# Patient Record
Sex: FEMALE | Race: WHITE | Employment: FULL TIME | URBAN - METROPOLITAN AREA
[De-identification: names, ages, dates, MRNs, and addresses within clinical notes are randomized per-mention and may not be internally consistent; named-entity substitution may affect disease eponyms.]

---

## 2020-01-21 ENCOUNTER — OFFICE VISIT (OUTPATIENT)
Dept: FAMILY MEDICINE CLINIC | Age: 55
End: 2020-01-21

## 2020-01-21 VITALS
BODY MASS INDEX: 41.78 KG/M2 | SYSTOLIC BLOOD PRESSURE: 163 MMHG | TEMPERATURE: 96.9 F | OXYGEN SATURATION: 96 % | HEIGHT: 66 IN | RESPIRATION RATE: 16 BRPM | HEART RATE: 93 BPM | DIASTOLIC BLOOD PRESSURE: 93 MMHG | WEIGHT: 260 LBS

## 2020-01-21 DIAGNOSIS — G56.01 CARPAL TUNNEL SYNDROME OF RIGHT WRIST: Primary | ICD-10-CM

## 2020-01-21 DIAGNOSIS — Z00.00 LABORATORY EXAM ORDERED AS PART OF ROUTINE GENERAL MEDICAL EXAMINATION: ICD-10-CM

## 2020-01-21 DIAGNOSIS — R20.0 NUMBNESS AND TINGLING: ICD-10-CM

## 2020-01-21 DIAGNOSIS — Z76.89 ENCOUNTER TO ESTABLISH CARE: ICD-10-CM

## 2020-01-21 DIAGNOSIS — Z13.1 DIABETES MELLITUS SCREENING: ICD-10-CM

## 2020-01-21 DIAGNOSIS — R20.2 NUMBNESS AND TINGLING: ICD-10-CM

## 2020-01-21 DIAGNOSIS — R53.83 FATIGUE, UNSPECIFIED TYPE: ICD-10-CM

## 2020-01-21 DIAGNOSIS — E66.01 OBESITY, MORBID (HCC): ICD-10-CM

## 2020-01-21 DIAGNOSIS — E55.9 VITAMIN D DEFICIENCY: ICD-10-CM

## 2020-01-21 DIAGNOSIS — Z13.220 LIPID SCREENING: ICD-10-CM

## 2020-01-21 RX ORDER — IBUPROFEN 200 MG
200 TABLET ORAL
COMMUNITY
End: 2021-10-14

## 2020-01-21 RX ORDER — BISMUTH SUBSALICYLATE 262 MG
1 TABLET,CHEWABLE ORAL DAILY
COMMUNITY

## 2020-01-21 NOTE — PROGRESS NOTES
Chief Complaint   Patient presents with    New Patient    Tingling     numbness and tingling/pain of feet, mostly at night; sx present for approx 1 yr- has become worse <6mo    Labs     routine labs; pt states she has not been to the doctor in quite some time       HPI:  [facility] is a 62y.o. year old female who is a new patient and is here to establish care. She was previously followed by no one in many years. She moved here for work, she is from [place]. Her sister is Yusuf Turner, another patient of mine. She works for [employer] and . Right Hand Carpal Tunnel:  Numbness/Tingling in right hand, thumb and first finger. She feels it is from using her mouse for years. She works for [employer] and [employer] and . Her symptoms are present all the time. Tingling: For approximately 1 year she reports numbness and tingling in both feet, worse in the left, worse at night but not every night. She occasionally has some throbbing pain in the arch of her foot, very infrequent. She has tried Aleve, Ibuprofen infrequently. Her feet feel full, heavy at night when they are hurting. She reports sensitivity with touching her feet. Her symptoms have really worsened over the past year. Elevated Blood Pressure:  No hx of HTN. Denies CP, SOB. Does report some intermittent CHRIS. Denies HA. BP Readings from Last 3 Encounters:   01/21/20 (!) 163/93     3 most recent PHQ Screens 1/21/2020   Little interest or pleasure in doing things Not at all   Feeling down, depressed, irritable, or hopeless Not at all   Total Score PHQ 2 0     The following sections were reviewed & updated as appropriate: PMH, PSH, PL, FMH,  and SH. Past Medical History:   Diagnosis Date    Carpal tunnel syndrome of right wrist     Cellulitis and abscess of left lower extremity        History reviewed. No pertinent surgical history.     Patient Active Problem List   Diagnosis Code    Obesity, morbid (HCC) E66.01    Carpal tunnel syndrome of right wrist G56.01    Numbness and tingling R20.0, R20.2        Family History   Problem Relation Age of Onset    Other Mother         osteopenia    Arthritis-osteo Mother     Hypertension Mother     Diabetes Mother     Hypertension Father     Diabetes Father     Heart Disease Father     Hypertension Sister        Social History     Socioeconomic History    Marital status: UNKNOWN     Spouse name: Not on file    Number of children: Not on file    Years of education: Not on file    Highest education level: Not on file   Occupational History    Not on file   Social Needs    Financial resource strain: Not on file    Food insecurity:     Worry: Not on file     Inability: Not on file    Transportation needs:     Medical: Not on file     Non-medical: Not on file   Tobacco Use    Smoking status: Never Smoker    Smokeless tobacco: Never Used   Substance and Sexual Activity    Alcohol use: Not Currently    Drug use: Never    Sexual activity: Not Currently     Partners: Male   Lifestyle    Physical activity:     Days per week: Not on file     Minutes per session: Not on file    Stress: Not on file   Relationships    Social connections:     Talks on phone: Not on file     Gets together: Not on file     Attends Baptism service: Not on file     Active member of club or organization: Not on file     Attends meetings of clubs or organizations: Not on file     Relationship status: Not on file    Intimate partner violence:     Fear of current or ex partner: Not on file     Emotionally abused: Not on file     Physically abused: Not on file     Forced sexual activity: Not on file   Other Topics Concern    Not on file   Social History Narrative    Not on file       Prior to Admission medications    Medication Sig Start Date End Date Taking? Authorizing Provider   multivitamin (ONE A DAY) tablet Take 1 Tab by mouth daily.    Yes Provider, Historical ibuprofen (MOTRIN) 200 mg tablet Take 200 mg by mouth every eight (8) hours as needed for Pain. Yes Provider, Historical        Allergies   Allergen Reactions    Penicillins Atopic Dermatitis    Tetanus And Diphtheria Toxoids Unknown (comments)     In childhood          Review of Systems  A comprehensive review of systems was negative except for that written in the HPI. Objective:       Visit Vitals  BP (!) 163/93   Pulse 93   Temp 96.9 °F (36.1 °C) (Oral)   Resp 16   Ht 5' 6\" (1.676 m)   Wt 260 lb (117.9 kg)   SpO2 96%   BMI 41.97 kg/m²       Physical Exam  Gen: alert, oriented, no acute distress  Head: normocephalic, atraumatic  Ears: external auditory canals clear, TMs without erythema or effusion  Eyes: pupils equal round reactive to light, sclera clear, conjunctiva clear  Oral: moist mucus membranes, no oral lesions, no pharyngeal inflammation or exudate  Neck: symmetric normal sized thyroid, no carotid bruits, no jugular vein distention  Resp: no increase work of breathing, lungs clear to ausculation bilaterally, no wheezing, rales or rhonchi  CV: S1, S2 normal.  No murmurs, rubs, or gallops. Abd: soft, not tender, not distended. No hepatosplenomegaly. Normal bowel sounds. No hernias. No abdominal or renal bruits. Neuro: cranial nerves intact, normal strength and movement in all extremities, reflexes and sensation intact and symmetric. Skin: no lesion or rash  Extremities: no cyanosis or edema    Assessment & Plan:    ICD-10-CM ICD-9-CM    1. Carpal tunnel syndrome of right wrist G56.01 354.0 REFERRAL TO ORTHOPEDICS   2. Obesity, morbid ([address] E66.01 278.01 URINALYSIS W/ RFLX MICROSCOPIC      LIPID PANEL      METABOLIC PANEL, COMPREHENSIVE      TSH 3RD GENERATION      VITAMIN D, 25 HYDROXY      HEMOGLOBIN A1C WITH EAG      CBC WITH AUTOMATED DIFF      VITAMIN B12      MAGNESIUM      REFERRAL TO ORTHOPEDICS   3.  Encounter to establish care Z76.89 V65.8 URINALYSIS W/ RFLX MICROSCOPIC      LIPID PANEL      METABOLIC PANEL, COMPREHENSIVE      TSH 3RD GENERATION      VITAMIN D, 25 HYDROXY      HEMOGLOBIN A1C WITH EAG      CBC WITH AUTOMATED DIFF      VITAMIN B12      MAGNESIUM      REFERRAL TO ORTHOPEDICS   4. Diabetes mellitus screening Z13.1 V77.1 URINALYSIS W/ RFLX MICROSCOPIC      METABOLIC PANEL, COMPREHENSIVE      HEMOGLOBIN A1C WITH EAG   5. Lipid screening Z13.220 V77.91 LIPID PANEL   6. Laboratory exam ordered as part of routine general medical examination Z00.00 V72.62 URINALYSIS W/ RFLX MICROSCOPIC      LIPID PANEL      METABOLIC PANEL, COMPREHENSIVE      TSH 3RD GENERATION      VITAMIN D, 25 HYDROXY      HEMOGLOBIN A1C WITH EAG      CBC WITH AUTOMATED DIFF      VITAMIN B12      MAGNESIUM      REFERRAL TO ORTHOPEDICS   7. Fatigue, unspecified type R53.83 780.79 URINALYSIS W/ RFLX MICROSCOPIC      LIPID PANEL      METABOLIC PANEL, COMPREHENSIVE      TSH 3RD GENERATION      VITAMIN D, 25 HYDROXY      HEMOGLOBIN A1C WITH EAG      CBC WITH AUTOMATED DIFF      VITAMIN B12      MAGNESIUM      REFERRAL TO ORTHOPEDICS   8. Vitamin D deficiency E55.9 268.9 VITAMIN D, 25 HYDROXY   9. Numbness and tingling R20.0 782.0 URINALYSIS W/ RFLX MICROSCOPIC    R20.2  LIPID PANEL      METABOLIC PANEL, COMPREHENSIVE      TSH 3RD GENERATION      VITAMIN D, 25 HYDROXY      HEMOGLOBIN A1C WITH EAG      CBC WITH AUTOMATED DIFF      VITAMIN B12      MAGNESIUM      REFERRAL TO ORTHOPEDICS     Diagnoses and all orders for this visit:    1. Carpal tunnel syndrome of right wrist  -     REFERRAL TO ORTHOPEDICS    2. Obesity, morbid ([address]  -     URINALYSIS W/ RFLX MICROSCOPIC; Future  -     LIPID PANEL; Future  -     METABOLIC PANEL, COMPREHENSIVE; Future  -     TSH 3RD GENERATION; Future  -     VITAMIN D, 25 HYDROXY; Future  -     HEMOGLOBIN A1C WITH EAG; Future  -     CBC WITH AUTOMATED DIFF; Future  -     VITAMIN B12; Future  -     MAGNESIUM; Future  -     REFERRAL TO ORTHOPEDICS    3.  Encounter to establish care  - URINALYSIS W/ RFLX MICROSCOPIC; Future  -     LIPID PANEL; Future  -     METABOLIC PANEL, COMPREHENSIVE; Future  -     TSH 3RD GENERATION; Future  -     VITAMIN D, 25 HYDROXY; Future  -     HEMOGLOBIN A1C WITH EAG; Future  -     CBC WITH AUTOMATED DIFF; Future  -     VITAMIN B12; Future  -     MAGNESIUM; Future  -     REFERRAL TO ORTHOPEDICS    4. Diabetes mellitus screening  -     URINALYSIS W/ RFLX MICROSCOPIC; Future  -     METABOLIC PANEL, COMPREHENSIVE; Future  -     HEMOGLOBIN A1C WITH EAG; Future    5. Lipid screening  -     LIPID PANEL; Future    6. Laboratory exam ordered as part of routine general medical examination  -     URINALYSIS W/ RFLX MICROSCOPIC; Future  -     LIPID PANEL; Future  -     METABOLIC PANEL, COMPREHENSIVE; Future  -     TSH 3RD GENERATION; Future  -     VITAMIN D, 25 HYDROXY; Future  -     HEMOGLOBIN A1C WITH EAG; Future  -     CBC WITH AUTOMATED DIFF; Future  -     VITAMIN B12; Future  -     MAGNESIUM; Future  -     REFERRAL TO ORTHOPEDICS    7. Fatigue, unspecified type  -     URINALYSIS W/ RFLX MICROSCOPIC; Future  -     LIPID PANEL; Future  -     METABOLIC PANEL, COMPREHENSIVE; Future  -     TSH 3RD GENERATION; Future  -     VITAMIN D, 25 HYDROXY; Future  -     HEMOGLOBIN A1C WITH EAG; Future  -     CBC WITH AUTOMATED DIFF; Future  -     VITAMIN B12; Future  -     MAGNESIUM; Future  -     REFERRAL TO ORTHOPEDICS    8. Vitamin D deficiency  -     VITAMIN D, 25 HYDROXY; Future    9. Numbness and tingling  -     URINALYSIS W/ RFLX MICROSCOPIC; Future  -     LIPID PANEL; Future  -     METABOLIC PANEL, COMPREHENSIVE; Future  -     TSH 3RD GENERATION; Future  -     VITAMIN D, 25 HYDROXY; Future  -     HEMOGLOBIN A1C WITH EAG; Future  -     CBC WITH AUTOMATED DIFF; Future  -     VITAMIN B12; Future  -     MAGNESIUM;  Future  -     REFERRAL TO ORTHOPEDICS      Follow-up and Dispositions    · Return in about 2 weeks (around 2/4/2020) for Medication Check, BP Check, Labs F/U.       lab results and schedule of future lab studies reviewed with patient - fasting labs ordered, patient will make an appointment to have these done prior to her next visit. reviewed diet, exercise and weight control  reviewed medications and side effects in detail    Differential diagnosis and treatment options reviewed with patient who is in agreement with treatment plan as outlined below. Health Maintenance reviewed - reviewed, allergic to tetanus, had flu vaccine at her employer in 10/2019, otherwise deferred to her next visit. Recommended healthy diet low in carbohydrates, fats, sodium and cholesterol. Recommended regular cardiovascular exercise 3-6 times per week for 30-60 minutes daily. Chart is reviewed and updated today in the office. Records requested for other providers patient has seen and is currently seeing.  (Prescription Monitoring Program) report was run and reviewed today in the office. Patient was offered a choice/choices in the treatment plan today. Patient expresses understanding of the plan and agrees with recommendations. Verbal and written instructions (see AVS) provided. See patient instructions for more. Patient expresses understanding of diagnosis and treatment plan.

## 2020-01-22 DIAGNOSIS — R20.0 NUMBNESS AND TINGLING: ICD-10-CM

## 2020-01-22 DIAGNOSIS — E66.01 OBESITY, MORBID (HCC): ICD-10-CM

## 2020-01-22 DIAGNOSIS — E55.9 VITAMIN D DEFICIENCY: ICD-10-CM

## 2020-01-22 DIAGNOSIS — Z76.89 ENCOUNTER TO ESTABLISH CARE: ICD-10-CM

## 2020-01-22 DIAGNOSIS — R53.83 FATIGUE, UNSPECIFIED TYPE: ICD-10-CM

## 2020-01-22 DIAGNOSIS — Z13.1 DIABETES MELLITUS SCREENING: ICD-10-CM

## 2020-01-22 DIAGNOSIS — Z13.220 LIPID SCREENING: ICD-10-CM

## 2020-01-22 DIAGNOSIS — Z00.00 LABORATORY EXAM ORDERED AS PART OF ROUTINE GENERAL MEDICAL EXAMINATION: ICD-10-CM

## 2020-01-22 DIAGNOSIS — R20.2 NUMBNESS AND TINGLING: ICD-10-CM

## 2020-01-24 LAB
25(OH)D3+25(OH)D2 SERPL-MCNC: 14.1 NG/ML (ref 30–100)
ALBUMIN SERPL-MCNC: 3.5 G/DL (ref 3.8–4.9)
ALBUMIN/GLOB SERPL: 0.9 {RATIO} (ref 1.2–2.2)
ALP SERPL-CCNC: 170 IU/L (ref 39–117)
ALT SERPL-CCNC: 39 IU/L (ref 0–32)
APPEARANCE UR: ABNORMAL
AST SERPL-CCNC: 26 IU/L (ref 0–40)
BACTERIA #/AREA URNS HPF: ABNORMAL /[HPF]
BASOPHILS # BLD AUTO: 0.1 X10E3/UL (ref 0–0.2)
BASOPHILS NFR BLD AUTO: 1 %
BILIRUB SERPL-MCNC: 0.7 MG/DL (ref 0–1.2)
BILIRUB UR QL STRIP: NEGATIVE
BUN SERPL-MCNC: 15 MG/DL (ref 6–24)
BUN/CREAT SERPL: 29 (ref 9–23)
CALCIUM SERPL-MCNC: 9.5 MG/DL (ref 8.7–10.2)
CASTS URNS MICRO: ABNORMAL
CASTS URNS QL MICRO: PRESENT /LPF
CHLORIDE SERPL-SCNC: 94 MMOL/L (ref 96–106)
CHOLEST SERPL-MCNC: 454 MG/DL (ref 100–199)
CO2 SERPL-SCNC: 20 MMOL/L (ref 20–29)
COLOR UR: YELLOW
CREAT SERPL-MCNC: 0.51 MG/DL (ref 0.57–1)
EOSINOPHIL # BLD AUTO: 0.3 X10E3/UL (ref 0–0.4)
EOSINOPHIL NFR BLD AUTO: 5 %
EPI CELLS #/AREA URNS HPF: >10 /HPF (ref 0–10)
ERYTHROCYTE [DISTWIDTH] IN BLOOD BY AUTOMATED COUNT: 13 % (ref 11.7–15.4)
EST. AVERAGE GLUCOSE BLD GHB EST-MCNC: 260 MG/DL
GLOBULIN SER CALC-MCNC: 3.7 G/DL (ref 1.5–4.5)
GLUCOSE SERPL-MCNC: 306 MG/DL (ref 65–99)
GLUCOSE UR QL: ABNORMAL
HBA1C MFR BLD: 10.7 % (ref 4.8–5.6)
HCT VFR BLD AUTO: 46.7 % (ref 34–46.6)
HDLC SERPL-MCNC: 28 MG/DL
HGB BLD-MCNC: 15.5 G/DL (ref 11.1–15.9)
HGB UR QL STRIP: NEGATIVE
IMM GRANULOCYTES # BLD AUTO: 0 X10E3/UL (ref 0–0.1)
IMM GRANULOCYTES NFR BLD AUTO: 0 %
INTERPRETATION, 910389: NORMAL
KETONES UR QL STRIP: ABNORMAL
LDLC SERPL CALC-MCNC: ABNORMAL MG/DL (ref 0–99)
LEUKOCYTE ESTERASE UR QL STRIP: ABNORMAL
LYMPHOCYTES # BLD AUTO: 2.2 X10E3/UL (ref 0.7–3.1)
LYMPHOCYTES NFR BLD AUTO: 37 %
Lab: NORMAL
MAGNESIUM SERPL-MCNC: 1.9 MG/DL (ref 1.6–2.3)
MCH RBC QN AUTO: 29 PG (ref 26.6–33)
MCHC RBC AUTO-ENTMCNC: 33.2 G/DL (ref 31.5–35.7)
MCV RBC AUTO: 88 FL (ref 79–97)
MICRO URNS: ABNORMAL
MONOCYTES # BLD AUTO: 0.3 X10E3/UL (ref 0.1–0.9)
MONOCYTES NFR BLD AUTO: 6 %
MUCOUS THREADS URNS QL MICRO: PRESENT
NEUTROPHILS # BLD AUTO: 3.2 X10E3/UL (ref 1.4–7)
NEUTROPHILS NFR BLD AUTO: 51 %
NITRITE UR QL STRIP: NEGATIVE
PH UR STRIP: 5.5 [PH] (ref 5–7.5)
PLATELET # BLD AUTO: 360 X10E3/UL (ref 150–450)
POTASSIUM SERPL-SCNC: 4.7 MMOL/L (ref 3.5–5.2)
PROT SERPL-MCNC: 7.2 G/DL (ref 6–8.5)
PROT UR QL STRIP: ABNORMAL
RBC # BLD AUTO: 5.34 X10E6/UL (ref 3.77–5.28)
RBC #/AREA URNS HPF: ABNORMAL /HPF (ref 0–2)
SODIUM SERPL-SCNC: 134 MMOL/L (ref 134–144)
SP GR UR: >=1.03 (ref 1–1.03)
TRIGL SERPL-MCNC: 1466 MG/DL (ref 0–149)
TSH SERPL DL<=0.005 MIU/L-ACNC: 1.86 UIU/ML (ref 0.45–4.5)
UROBILINOGEN UR STRIP-MCNC: 0.2 MG/DL (ref 0.2–1)
VIT B12 SERPL-MCNC: 286 PG/ML (ref 232–1245)
VLDLC SERPL CALC-MCNC: ABNORMAL MG/DL (ref 5–40)
WBC # BLD AUTO: 6.1 X10E3/UL (ref 3.4–10.8)
WBC #/AREA URNS HPF: ABNORMAL /HPF (ref 0–5)

## 2020-02-04 ENCOUNTER — OFFICE VISIT (OUTPATIENT)
Dept: FAMILY MEDICINE CLINIC | Age: 55
End: 2020-02-04

## 2020-02-04 VITALS
TEMPERATURE: 96.3 F | BODY MASS INDEX: 40.26 KG/M2 | SYSTOLIC BLOOD PRESSURE: 150 MMHG | HEIGHT: 66 IN | RESPIRATION RATE: 18 BRPM | DIASTOLIC BLOOD PRESSURE: 75 MMHG | HEART RATE: 80 BPM | WEIGHT: 250.5 LBS

## 2020-02-04 DIAGNOSIS — I10 ESSENTIAL HYPERTENSION: ICD-10-CM

## 2020-02-04 DIAGNOSIS — E11.9 CONTROLLED TYPE 2 DIABETES MELLITUS WITHOUT COMPLICATION, WITHOUT LONG-TERM CURRENT USE OF INSULIN (HCC): Primary | ICD-10-CM

## 2020-02-04 DIAGNOSIS — R20.2 NUMBNESS AND TINGLING: ICD-10-CM

## 2020-02-04 DIAGNOSIS — D58.2 ELEVATED HEMOGLOBIN (HCC): ICD-10-CM

## 2020-02-04 DIAGNOSIS — R20.0 NUMBNESS AND TINGLING: ICD-10-CM

## 2020-02-04 DIAGNOSIS — E53.8 VITAMIN B12 DEFICIENCY: ICD-10-CM

## 2020-02-04 DIAGNOSIS — E55.9 VITAMIN D DEFICIENCY: ICD-10-CM

## 2020-02-04 DIAGNOSIS — E78.2 HYPERCHOLESTEROLEMIA WITH HYPERTRIGLYCERIDEMIA: ICD-10-CM

## 2020-02-04 DIAGNOSIS — Z11.59 ENCOUNTER FOR HEPATITIS C SCREENING TEST FOR LOW RISK PATIENT: ICD-10-CM

## 2020-02-04 DIAGNOSIS — E66.01 OBESITY, MORBID (HCC): ICD-10-CM

## 2020-02-04 RX ORDER — LISINOPRIL 10 MG/1
10 TABLET ORAL DAILY
Qty: 90 TAB | Refills: 1 | Status: SHIPPED | OUTPATIENT
Start: 2020-02-04 | End: 2020-03-23 | Stop reason: SDUPTHER

## 2020-02-04 RX ORDER — INSULIN PUMP SYRINGE, 3 ML
EACH MISCELLANEOUS
Qty: 1 KIT | Refills: 0 | Status: SHIPPED | OUTPATIENT
Start: 2020-02-04

## 2020-02-04 RX ORDER — ERGOCALCIFEROL 1.25 MG/1
50000 CAPSULE ORAL
Qty: 5 CAP | Refills: 2 | Status: SHIPPED | OUTPATIENT
Start: 2020-02-04 | End: 2020-03-16 | Stop reason: SDUPTHER

## 2020-02-04 RX ORDER — ATORVASTATIN CALCIUM 20 MG/1
20 TABLET, FILM COATED ORAL DAILY
Qty: 30 TAB | Refills: 1 | Status: SHIPPED | OUTPATIENT
Start: 2020-02-04 | End: 2020-03-23 | Stop reason: SDUPTHER

## 2020-02-04 RX ORDER — CYANOCOBALAMIN 1000 UG/ML
1000 INJECTION, SOLUTION INTRAMUSCULAR; SUBCUTANEOUS
Qty: 3 VIAL | Refills: 1 | Status: SHIPPED | OUTPATIENT
Start: 2020-02-04 | End: 2020-03-16 | Stop reason: SDUPTHER

## 2020-02-04 RX ORDER — LANCETS
EACH MISCELLANEOUS
Qty: 1 EACH | Refills: 11 | Status: SHIPPED | OUTPATIENT
Start: 2020-02-04 | End: 2021-02-16 | Stop reason: SDUPTHER

## 2020-02-04 RX ORDER — METFORMIN HYDROCHLORIDE 500 MG/1
TABLET ORAL
Qty: 70 TAB | Refills: 0 | Status: SHIPPED | OUTPATIENT
Start: 2020-02-04 | End: 2020-03-03 | Stop reason: SDUPTHER

## 2020-02-04 NOTE — PROGRESS NOTES
Chief Complaint   Patient presents with    Follow-up     Lab results         HPI:  The patient is a 62 y.o. female who presents today for a follow up appointment. No hospital, ER or specialist visits since last primary care visit except as noted below. She moved here for work, she is from [place]. Her sister is Sidra Austin, another patient of mine. She works for [employer] and . Right Hand Carpal Tunnel:  Numbness/Tingling in right hand, thumb and first finger. She feels it is from using her mouse for years. She works for [employer] and . Her symptoms are present all the time. Tingling: For approximately 1 year she reports numbness and tingling in both feet, worse in the left, worse at night but not every night. She occasionally has some throbbing pain in the arch of her foot, very infrequent. She has tried Aleve, Ibuprofen infrequently. Her feet feel full, heavy at night when they are hurting. She reports sensitivity with touching her feet. Her symptoms have really worsened over the past year. Elevated Blood Pressure:  No hx of HTN until now. Denies CP, SOB. Does report some intermittent CHRIS. Denies HA. BP Readings from Last 3 Encounters:   02/04/20 150/75   01/21/20 (!) 163/93     Labs:  Labs reviewed with the patient in the office today at her visit, 2/4/2020: Your lab results have been reviewed and are as follows:    CBC:  Your red blood cell count is slightly elevated. Your white blood cell count is normal.   Your platelet count is normal.   You are not anemic and your hemoglobin is 15.5. CMP:  Fasting blood sugar is normal at 306, your hemoglobin A1C is 10.7. You do have diabetes. We will start Metformin at this time. We will recheck your Hgb A1C in 3 months. Kidney function is normal.   Your electrolytes are normal.   Your liver function is ok except 2 liver enzymes are slightly elevated.   We will recheck these in 3 months as well. Urine Test:  Your urine analysis is abnormal but all the abnormalities are likely related to high glucose levels. These will be corrected with decreasing your blood sugar to a normal range. Your urine shows no signs of UTI. Cholesterol Panel: Total Cholesterol is 454 (goal <200). Triglycerides are 1,466.  (goal <150)  Your HDL or \"good\" cholesterol is 28. (goal >40). Your LDL or \"bad\" cholesterol is indeterminate.  (goal <100). We will start you on Lipitor 20mg daily. We will recheck your cholesterol levels in 3 months. Thyroid:  Your thyroid function is normal.    Vitamin D:  Your vitamin D is low at 14.1. This is an important nutrient bone health and to fight inflammation and infection. We will start a prescription to increase your Vitamin D level for the next 3 months. You will take 50,000 international units Vitamin D2 in the form of one tab taken once weekly, I have sent this prescription to your pharmacy on file. After you complete the prescription, start taking OTC Vitamin D3 2,000 international units daily. After three months we will recheck your levels. A normal value is between 40-50 on average. The symptoms of vitamin D deficiency are sometimes vague and can include tiredness and general aches and pains. Some people may not have any symptoms at all. Vitamin D also fights infections, including colds and the flu, as it regulates the expression of genes that influence your immune system to attack and destroy bacteria and viruses. Your vitamin B12 is low. We will start Vitamin B12 monthly injections at this time. We will recheck this again in 3 months. Your magnesium level is normal.     lab results and schedule of future lab studies reviewed with patient - fasting labs ordered, patient will make an appointment to have these done prior to her next visit.   reviewed diet, exercise and weight control  reviewed medications and side effects in detail     Differential diagnosis and treatment options reviewed with patient who is in agreement with treatment plan as outlined below.     Health Maintenance reviewed - reviewed, allergic to tetanus, had flu vaccine at her employer in 10/2019, otherwise deferred to her next visit. Review of Systems  A comprehensive review of systems was negative except for that written in the HPI. Patient Active Problem List   Diagnosis Code    Obesity, morbid ([facility] [address] E66.01    Carpal tunnel syndrome of right wrist G56.01    Numbness and tingling R20.0, R20.2    Controlled type 2 diabetes mellitus without complication, without long-term current use of insulin (HCC) E11.9    Hypercholesterolemia with hypertriglyceridemia E78.2    Vitamin D deficiency E55.9    Vitamin B12 deficiency E53.8    Elevated hemoglobin ([facility]) D58.2    Essential hypertension I10       Past Medical History:   Diagnosis Date    Carpal tunnel syndrome of right wrist     Cellulitis and abscess of left lower extremity        History reviewed. No pertinent surgical history. Social History     Tobacco Use    Smoking status: Never Smoker    Smokeless tobacco: Never Used   Substance Use Topics    Alcohol use: Not Currently    Drug use: Never       Family History   Problem Relation Age of Onset    Other Mother         osteopenia    Arthritis-osteo Mother     Hypertension Mother     Diabetes Mother     Hypertension Father     Diabetes Father     Heart Disease Father     Hypertension Sister        Outpatient Medications Marked as Taking for the 2/4/20 encounter (Office Visit) with Aguilar Finn NP   Medication Sig Dispense Refill    metFORMIN (GLUCOPHAGE) 500 mg tablet Wk 1 Take 1 tab my mouth qd, Wk 2 Take 1 tab by mouth bid, Wk 3 Take 1 tab PO in AM and 2 tab PO in PM Wk 4: Take 2 tab by mouth bid 70 Tab 0    Blood-Glucose Meter monitoring kit For use to check blood sugar once a day in morning before eating.  Please dispense brand covered by insurance. 1 Kit 0    glucose blood VI test strips (ASCENSIA AUTODISC VI, ONE TOUCH ULTRA TEST VI) strip For use with glucometer to check blood sugar once a day in morning before eating. Please dispense brand covered by insurance. 100 Strip 2    lancets misc For use with glucometer to check blood sugar once a day in morning before eating. Please dispense brand covered by insurance. 1 Each 11    atorvastatin (LIPITOR) 20 mg tablet Take 1 Tab by mouth daily. 30 Tab 1    ergocalciferol (ERGOCALCIFEROL) 1,250 mcg (50,000 unit) capsule Take 1 Cap by mouth every seven (7) days. 5 Cap 2    cyanocobalamin (VITAMIN B12) 1,000 mcg/mL injection 1 mL by IntraMUSCular route every thirty (30) days. 3 Vial 1    Syringe with Needle, Safety ([employer] INTEGRA SYRINGE) 3 mL 25 gauge x 5/8\" syrg Use as directed monthly 3 Pen Needle 1    lisinopril (PRINIVIL, ZESTRIL) 10 mg tablet Take 1 Tab by mouth daily. 90 Tab 1    multivitamin (ONE A DAY) tablet Take 1 Tab by mouth daily.  ibuprofen (MOTRIN) 200 mg tablet Take 200 mg by mouth every eight (8) hours as needed for Pain. Current Outpatient Medications on File Prior to Visit   Medication Sig Dispense Refill    multivitamin (ONE A DAY) tablet Take 1 Tab by mouth daily.  ibuprofen (MOTRIN) 200 mg tablet Take 200 mg by mouth every eight (8) hours as needed for Pain. No current facility-administered medications on file prior to visit.         Allergies   Allergen Reactions    Penicillins Atopic Dermatitis    Tetanus And Diphtheria Toxoids Unknown (comments)     In childhood       PE:  Visit Vitals  /75   Pulse 80   Temp 96.3 °F (35.7 °C) (Oral)   Resp 18   Ht 5' 6\" (1.676 m)   Wt 250 lb 8 oz (113.6 kg)   BMI 40.43 kg/m²       Gen: alert, oriented, no acute distress  Head: normocephalic, atraumatic  Ears: external auditory canals clear, TMs without erythema or effusion  Eyes: pupils equal round reactive to light, sclera clear, conjunctiva clear  Oral: moist mucus membranes, no oral lesions, no pharyngeal inflammation or exudate  Neck: symmetric normal sized thyroid, no carotid bruits, no jugular vein distention  Resp: no increase work of breathing, lungs clear to ausculation bilaterally, no wheezing, rales or rhonchi  CV: S1, S2 normal.  No murmurs, rubs, or gallops. Abd: soft, not tender, not distended. No hepatosplenomegaly. Normal bowel sounds. No hernias. No abdominal or renal bruits. Neuro: cranial nerves intact, normal strength and movement in all extremities, reflexes and sensation intact and symmetric. Skin: no lesion or rash  Extremities: no cyanosis or edema    No results found for this visit on 02/04/20. Assessment/Plan:    ICD-10-CM ICD-9-CM    1. Controlled type 2 diabetes mellitus without complication, without long-term current use of insulin (HCC) E11.9 250.00 metFORMIN (GLUCOPHAGE) 500 mg tablet      Blood-Glucose Meter monitoring kit      glucose blood VI test strips (ASCENSIA AUTODISC VI, ONE TOUCH ULTRA TEST VI) strip      lancets misc      MICROALBUMIN, UR, RAND W/ MICROALB/CREAT RATIO      URINALYSIS W/ RFLX MICROSCOPIC      METABOLIC PANEL, COMPREHENSIVE      HEMOGLOBIN A1C WITH EAG   2. Hypercholesterolemia with hypertriglyceridemia E78.2 272.2 atorvastatin (LIPITOR) 20 mg tablet      LIPID PANEL   3. Vitamin D deficiency E55.9 268.9 ergocalciferol (ERGOCALCIFEROL) 1,250 mcg (50,000 unit) capsule      VITAMIN D, 25 HYDROXY   4. Vitamin B12 deficiency E53.8 266.2 cyanocobalamin (VITAMIN B12) 1,000 mcg/mL injection      Syringe with Needle, Safety (BD INTEGRA SYRINGE) 3 mL 25 gauge x 5/8\" syrg      VITAMIN B12   5. Elevated hemoglobin (HCC) D58.2 282.7 CBC WITH AUTOMATED DIFF   6. Essential hypertension I10 401.9 lisinopril (PRINIVIL, ZESTRIL) 10 mg tablet   7. Encounter for hepatitis C screening test for low risk patient Z11.59 V73.89 HEPATITIS C AB   8. Obesity, morbid ([facility] [address] E66.01 278.01    9.  Numbness and tingling R20.0 782.0     R20.2       Diagnoses and all orders for this visit:    1. Controlled type 2 diabetes mellitus without complication, without long-term current use of insulin (HCC)  -     metFORMIN (GLUCOPHAGE) 500 mg tablet; Wk 1 Take 1 tab my mouth qd, Wk 2 Take 1 tab by mouth bid, Wk 3 Take 1 tab PO in AM and 2 tab PO in PM Wk 4: Take 2 tab by mouth bid  -     Blood-Glucose Meter monitoring kit; For use to check blood sugar once a day in morning before eating. Please dispense brand covered by insurance. -     glucose blood VI test strips (ASCENSIA AUTODISC VI, ONE TOUCH ULTRA TEST VI) strip; For use with glucometer to check blood sugar once a day in morning before eating. Please dispense brand covered by insurance. -     lancets misc; For use with glucometer to check blood sugar once a day in morning before eating. Please dispense brand covered by insurance. -     MICROALBUMIN, UR, RAND W/ MICROALB/CREAT RATIO; Future  -     URINALYSIS W/ RFLX MICROSCOPIC; Future  -     METABOLIC PANEL, COMPREHENSIVE; Future  -     HEMOGLOBIN A1C WITH EAG; Future    2. Hypercholesterolemia with hypertriglyceridemia  -     atorvastatin (LIPITOR) 20 mg tablet; Take 1 Tab by mouth daily.  -     LIPID PANEL; Future    3. Vitamin D deficiency  -     ergocalciferol (ERGOCALCIFEROL) 1,250 mcg (50,000 unit) capsule; Take 1 Cap by mouth every seven (7) days. -     VITAMIN D, 25 HYDROXY; Future    4. Vitamin B12 deficiency  -     cyanocobalamin (VITAMIN B12) 1,000 mcg/mL injection; 1 mL by IntraMUSCular route every thirty (30) days. -     Syringe with Needle, Safety (BD INTEGRA SYRINGE) 3 mL 25 gauge x 5/8\" syrg; Use as directed monthly  -     VITAMIN B12; Future    5. Elevated hemoglobin (HCC)  -     CBC WITH AUTOMATED DIFF; Future    6. Essential hypertension  -     lisinopril (PRINIVIL, ZESTRIL) 10 mg tablet; Take 1 Tab by mouth daily.     7. Encounter for hepatitis C screening test for low risk patient  -     HEPATITIS C AB; Future    8. Obesity, morbid ([facility] [address]    9. Numbness and tingling      Follow-up and Dispositions    · Return in about 2 weeks (around 2/18/2020) for Medication Check, Hypertension, Diabetes. lab results and schedule of future lab studies reviewed with patient  reviewed diet, exercise and weight control  reviewed medications and side effects in detail    lose weight, increase physical activity, call if any problems    Health Maintenance reviewed - deferred to next visit. Recommended healthy diet low in carbohydrates, fats, sodium and cholesterol. Recommended regular cardiovascular exercise 3-6 times per week for 30-60 minutes daily. Chart is reviewed and updated today in the office. Records requested for other providers patient has seen and is currently seeing. Patient was offered a choice/choices in the treatment plan today. Patient expresses understanding of the plan and agrees with recommendations. Verbal and written instructions (see AVS) provided. See patient instructions for more. Patient expresses understanding of diagnosis and treatment plan.

## 2020-02-05 NOTE — PROGRESS NOTES
Labs reviewed with the patient in the office today at her visit, 2/4/2020: Your lab results have been reviewed and are as follows:    CBC:  Your red blood cell count is slightly elevated. Your white blood cell count is normal.   Your platelet count is normal.   You are not anemic and your hemoglobin is 15.5. CMP:  Fasting blood sugar is normal at 306, your hemoglobin A1C is 10.7. You do have diabetes. We will start Metformin at this time. We will recheck your Hgb A1C in 3 months. Kidney function is normal.   Your electrolytes are normal.   Your liver function is ok except 2 liver enzymes are slightly elevated. We will recheck these in 3 months as well. Urine Test:  Your urine analysis is abnormal but all the abnormalities are likely related to high glucose levels. These will be corrected with decreasing your blood sugar to a normal range. Your urine shows no signs of UTI. Cholesterol Panel: Total Cholesterol is 454 (goal <200). Triglycerides are 1,466.  (goal <150)  Your HDL or \"good\" cholesterol is 28. (goal >40). Your LDL or \"bad\" cholesterol is indeterminate.  (goal <100). We will start you on Lipitor 20mg daily. We will recheck your cholesterol levels in 3 months. Thyroid:  Your thyroid function is normal.    Vitamin D:  Your vitamin D is low at 14.1. This is an important nutrient bone health and to fight inflammation and infection. We will start a prescription to increase your Vitamin D level for the next 3 months. You will take 50,000 international units Vitamin D2 in the form of one tab taken once weekly, I have sent this prescription to your pharmacy on file. After you complete the prescription, start taking OTC Vitamin D3 2,000 international units daily. After three months we will recheck your levels. A normal value is between 40-50 on average. The symptoms of vitamin D deficiency are sometimes vague and can include tiredness and general aches and pains.  Some people may not have any symptoms at all. Vitamin D also fights infections, including colds and the flu, as it regulates the expression of genes that influence your immune system to attack and destroy bacteria and viruses. Your vitamin B12 is low. We will start Vitamin B12 monthly injections at this time. We will recheck this again in 3 months. Your magnesium level is normal.    Barbara Buck, MSN, MHA, FNP-BC

## 2020-02-05 NOTE — PATIENT INSTRUCTIONS
Labs reviewed with the patient in the office today at her visit, 2/4/2020: Your lab results have been reviewed and are as follows:    CBC:  Your red blood cell count is slightly elevated. Your white blood cell count is normal.   Your platelet count is normal.   You are not anemic and your hemoglobin is 15.5. CMP:  Fasting blood sugar is normal at 306, your hemoglobin A1C is 10.7. You do have diabetes. We will start Metformin at this time. We will recheck your Hgb A1C in 3 months. Kidney function is normal.   Your electrolytes are normal.   Your liver function is ok except 2 liver enzymes are slightly elevated. We will recheck these in 3 months as well. Urine Test:  Your urine analysis is abnormal but all the abnormalities are likely related to high glucose levels. These will be corrected with decreasing your blood sugar to a normal range. Your urine shows no signs of UTI. Cholesterol Panel: Total Cholesterol is 454 (goal <200). Triglycerides are 1,466.  (goal <150)  Your HDL or \"good\" cholesterol is 28. (goal >40). Your LDL or \"bad\" cholesterol is indeterminate.  (goal <100). We will start you on Lipitor 20mg daily. We will recheck your cholesterol levels in 3 months. Thyroid:  Your thyroid function is normal.    Vitamin D:  Your vitamin D is low at 14.1. This is an important nutrient bone health and to fight inflammation and infection. We will start a prescription to increase your Vitamin D level for the next 3 months. You will take 50,000 international units Vitamin D2 in the form of one tab taken once weekly, I have sent this prescription to your pharmacy on file. After you complete the prescription, start taking OTC Vitamin D3 2,000 international units daily. After three months we will recheck your levels. A normal value is between 40-50 on average. The symptoms of vitamin D deficiency are sometimes vague and can include tiredness and general aches and pains.  Some people may not have any symptoms at all. Vitamin D also fights infections, including colds and the flu, as it regulates the expression of genes that influence your immune system to attack and destroy bacteria and viruses. Your vitamin B12 is low. We will start Vitamin B12 monthly injections at this time. We will recheck this again in 3 months. Your magnesium level is normal.         Type 2 Diabetes: Care Instructions  Your Care Instructions    Type 2 diabetes is a disease that develops when the body's tissues cannot use insulin properly. Over time, the pancreas cannot make enough insulin. Insulin is a hormone that helps the body's cells use sugar (glucose) for energy. It also helps the body store extra sugar in muscle, fat, and liver cells. Without insulin, the sugar cannot get into the cells to do its work. It stays in the blood instead. This can cause high blood sugar levels. A person has diabetes when the blood sugar stays too high too much of the time. Over time, diabetes can lead to diseases of the heart, blood vessels, nerves, kidneys, and eyes. You may be able to control your blood sugar by losing weight, eating a healthy diet, and getting daily exercise. You may also have to take insulin or other diabetes medicine. Follow-up care is a key part of your treatment and safety. Be sure to make and go to all appointments. Call your doctor if you are having problems. It's also a good idea to know your test results and keep a list of the medicines you take. How can you care for yourself at home? · Keep your blood sugar at a target level (which you set with your doctor). ? Eat a good diet that spreads carbohydrate throughout the day. Carbohydrate--the body's main source of fuel--affects blood sugar more than any other nutrient. Carbohydrate is in fruits, vegetables, milk, and yogurt.  It also is in breads, cereals, vegetables such as potatoes and corn, and sugary foods such as candy and cakes.  ? Aim for 30 minutes of exercise on most, preferably all, days of the week. Walking is a good choice. You also may want to do other activities, such as running, swimming, cycling, or playing tennis or team sports. If your doctor says it's okay, do muscle-strengthening exercises at least 2 times a week. ? Take your medicines exactly as prescribed. Call your doctor if you think you are having a problem with your medicine. You will get more details on the specific medicines your doctor prescribes. · Check your blood sugar as often as your doctor recommends. It is important to keep track of any symptoms you have, such as low blood sugar. Also tell your doctor if you have any changes in your activities, diet, or insulin use. · Talk to your doctor before you start taking aspirin every day. Aspirin can help certain people lower their risk of a heart attack or stroke. But taking aspirin isn't right for everyone, because it can cause serious bleeding. · Do not smoke. If you need help quitting, talk to your doctor about stop-smoking programs and medicines. These can increase your chances of quitting for good. · Keep your cholesterol and blood pressure at normal levels. You may need to take one or more medicines to reach your goals. Take them exactly as directed. Do not stop or change a medicine without talking to your doctor first.  When should you call for help? Call 911 anytime you think you may need emergency care. For example, call if:    · You passed out (lost consciousness), or you suddenly become very sleepy or confused. (You may have very low blood sugar.)    Call your doctor now or seek immediate medical care if:    · Your blood sugar is 300 mg/dL or is higher than the level your doctor has set for you.     · You have symptoms of low blood sugar, such as:  ? Sweating. ? Feeling nervous, shaky, and weak. ? Extreme hunger and slight nausea. ? Dizziness and headache.  ? Blurred vision. ? Confusion.  Watch closely for changes in your health, and be sure to contact your doctor if:    · You often have problems controlling your blood sugar.     · You have symptoms of long-term diabetes problems, such as:  ? New vision changes. ? New pain, numbness, or tingling in your hands or feet. ? Skin problems. Where can you learn more? Go to http://lis-zena.info/. Enter C553 in the search box to learn more about \"Type 2 Diabetes: Care Instructions. \"  Current as of: April 16, 2019  Content Version: 12.2  © 2727-0357 [employer]. Care instructions adapted under license by [email or web address] (which disclaims liability or warranty for this information). If you have questions about a medical condition or this instruction, always ask your healthcare professional. [address] any warranty or liability for your use of this information. High Blood Pressure: Care Instructions  Overview    It's normal for blood pressure to go up and down throughout the day. But if it stays up, you have high blood pressure. Another name for high blood pressure is hypertension. Despite what a lot of people think, high blood pressure usually doesn't cause headaches or make you feel dizzy or lightheaded. It usually has no symptoms. But it does increase your risk of stroke, heart attack, and other problems. You and your doctor will talk about your risks of these problems based on your blood pressure. Your doctor will give you a goal for your blood pressure. Your goal will be based on your health and your age. Lifestyle changes, such as eating healthy and being active, are always important to help lower blood pressure. You might also take medicine to reach your blood pressure goal.  Follow-up care is a key part of your treatment and safety. Be sure to make and go to all appointments, and call your doctor if you are having problems.  It's also a good idea to know your test results and keep a list of the medicines you take. How can you care for yourself at home? Medical treatment  · If you stop taking your medicine, your blood pressure will go back up. You may take one or more types of medicine to lower your blood pressure. Be safe with medicines. Take your medicine exactly as prescribed. Call your doctor if you think you are having a problem with your medicine. · Talk to your doctor before you start taking aspirin every day. Aspirin can help certain people lower their risk of a heart attack or stroke. But taking aspirin isn't right for everyone, because it can cause serious bleeding. · See your doctor regularly. You may need to see the doctor more often at first or until your blood pressure comes down. · If you are taking blood pressure medicine, talk to your doctor before you take decongestants or anti-inflammatory medicine, such as ibuprofen. Some of these medicines can raise blood pressure. · Learn how to check your blood pressure at home. Lifestyle changes  · Stay at a healthy weight. This is especially important if you put on weight around the waist. Losing even 10 pounds can help you lower your blood pressure. · If your doctor recommends it, get more exercise. Walking is a good choice. Bit by bit, increase the amount you walk every day. Try for at least 30 minutes on most days of the week. You also may want to swim, bike, or do other activities. · Avoid or limit alcohol. Talk to your doctor about whether you can drink any alcohol. · Try to limit how much sodium you eat to less than 2,300 milligrams (mg) a day. Your doctor may ask you to try to eat less than 1,500 mg a day. · Eat plenty of fruits (such as bananas and oranges), vegetables, legumes, whole grains, and low-fat dairy products. · Lower the amount of saturated fat in your diet. Saturated fat is found in animal products such as milk, cheese, and meat.  Limiting these foods may help you lose weight and also lower your risk for heart disease. · Do not smoke. Smoking increases your risk for heart attack and stroke. If you need help quitting, talk to your doctor about stop-smoking programs and medicines. These can increase your chances of quitting for good. When should you call for help? Call  911 anytime you think you may need emergency care. This may mean having symptoms that suggest that your blood pressure is causing a serious heart or blood vessel problem. Your blood pressure may be over 180/120.   For example, call  911 if:    · You have symptoms of a heart attack. These may include:  ? Chest pain or pressure, or a strange feeling in the chest.  ? Sweating. ? Shortness of breath. ? Nausea or vomiting. ? Pain, pressure, or a strange feeling in the back, neck, jaw, or upper belly or in one or both shoulders or arms. ? Lightheadedness or sudden weakness. ? A fast or irregular heartbeat.     · You have symptoms of a stroke. These may include:  ? Sudden numbness, tingling, weakness, or loss of movement in your face, arm, or leg, especially on only one side of your body. ? Sudden vision changes. ? Sudden trouble speaking. ? Sudden confusion or trouble understanding simple statements. ? Sudden problems with walking or balance. ? A sudden, severe headache that is different from past headaches.     · You have severe back or belly pain.    Do not wait until your blood pressure comes down on its own. Get help right away.   Call your doctor now or seek immediate care if:    · Your blood pressure is much higher than normal (such as 180/120 or higher), but you don't have symptoms.     · You think high blood pressure is causing symptoms, such as:  ? Severe headache.  ? Blurry vision.    Watch closely for changes in your health, and be sure to contact your doctor if:    · Your blood pressure measures higher than your doctor recommends at least 2 times.  That means the top number is higher or the bottom number is higher, or both.     · You think you may be having side effects from your blood pressure medicine. Where can you learn more? Go to http://lis-zena.info/. Enter J838 in the search box to learn more about \"High Blood Pressure: Care Instructions. \"  Current as of: April 9, 2019  Content Version: 12.2  © 3848-9534 [employer]. Care instructions adapted under license by [employer] (which disclaims liability or warranty for this information). If you have questions about a medical condition or this instruction, always ask your healthcare professional. [place] [address] any warranty or liability for your use of this information.

## 2020-03-03 ENCOUNTER — PATIENT MESSAGE (OUTPATIENT)
Dept: FAMILY MEDICINE CLINIC | Age: 55
End: 2020-03-03

## 2020-03-03 DIAGNOSIS — E11.9 CONTROLLED TYPE 2 DIABETES MELLITUS WITHOUT COMPLICATION, WITHOUT LONG-TERM CURRENT USE OF INSULIN (HCC): ICD-10-CM

## 2020-03-04 RX ORDER — METFORMIN HYDROCHLORIDE 500 MG/1
1000 TABLET ORAL 2 TIMES DAILY WITH MEALS
Qty: 120 TAB | Refills: 0 | Status: SHIPPED | OUTPATIENT
Start: 2020-03-04 | End: 2020-03-16

## 2020-03-16 ENCOUNTER — OFFICE VISIT (OUTPATIENT)
Dept: FAMILY MEDICINE CLINIC | Age: 55
End: 2020-03-16

## 2020-03-16 VITALS
RESPIRATION RATE: 16 BRPM | OXYGEN SATURATION: 96 % | HEART RATE: 73 BPM | BODY MASS INDEX: 40.21 KG/M2 | WEIGHT: 250.2 LBS | DIASTOLIC BLOOD PRESSURE: 64 MMHG | SYSTOLIC BLOOD PRESSURE: 131 MMHG | HEIGHT: 66 IN | TEMPERATURE: 96.6 F

## 2020-03-16 DIAGNOSIS — G62.9 NEUROPATHY: ICD-10-CM

## 2020-03-16 DIAGNOSIS — E11.9 CONTROLLED TYPE 2 DIABETES MELLITUS WITHOUT COMPLICATION, WITHOUT LONG-TERM CURRENT USE OF INSULIN (HCC): ICD-10-CM

## 2020-03-16 DIAGNOSIS — Z11.59 ENCOUNTER FOR HEPATITIS C SCREENING TEST FOR LOW RISK PATIENT: ICD-10-CM

## 2020-03-16 DIAGNOSIS — E55.9 VITAMIN D DEFICIENCY: ICD-10-CM

## 2020-03-16 DIAGNOSIS — R20.0 NUMBNESS AND TINGLING: ICD-10-CM

## 2020-03-16 DIAGNOSIS — E53.8 VITAMIN B12 DEFICIENCY: ICD-10-CM

## 2020-03-16 DIAGNOSIS — R60.0 PEDAL EDEMA: ICD-10-CM

## 2020-03-16 DIAGNOSIS — E78.2 HYPERCHOLESTEROLEMIA WITH HYPERTRIGLYCERIDEMIA: ICD-10-CM

## 2020-03-16 DIAGNOSIS — I10 ESSENTIAL HYPERTENSION: Primary | ICD-10-CM

## 2020-03-16 DIAGNOSIS — E66.01 OBESITY, MORBID (HCC): ICD-10-CM

## 2020-03-16 DIAGNOSIS — R20.2 NUMBNESS AND TINGLING: ICD-10-CM

## 2020-03-16 DIAGNOSIS — K59.03 DRUG-INDUCED CONSTIPATION: ICD-10-CM

## 2020-03-16 RX ORDER — ERGOCALCIFEROL 1.25 MG/1
50000 CAPSULE ORAL
Qty: 5 CAP | Refills: 2 | Status: SHIPPED | OUTPATIENT
Start: 2020-03-16 | End: 2020-03-23 | Stop reason: SDUPTHER

## 2020-03-16 RX ORDER — METFORMIN HYDROCHLORIDE 500 MG/1
500 TABLET ORAL 2 TIMES DAILY WITH MEALS
Qty: 60 TAB | Refills: 2 | Status: SHIPPED | OUTPATIENT
Start: 2020-03-16 | End: 2020-05-02 | Stop reason: SDUPTHER

## 2020-03-16 RX ORDER — DULOXETIN HYDROCHLORIDE 30 MG/1
30 CAPSULE, DELAYED RELEASE ORAL DAILY
Qty: 30 CAP | Refills: 2 | Status: SHIPPED | OUTPATIENT
Start: 2020-03-16 | End: 2020-05-04 | Stop reason: SDUPTHER

## 2020-03-16 RX ORDER — NEEDLES, FILTER 19GX1 1/2"
NEEDLE, DISPOSABLE MISCELLANEOUS
Qty: 3 PEN NEEDLE | Refills: 1 | Status: SHIPPED | OUTPATIENT
Start: 2020-03-16 | End: 2020-03-23 | Stop reason: SDUPTHER

## 2020-03-16 RX ORDER — CYANOCOBALAMIN 1000 UG/ML
1000 INJECTION, SOLUTION INTRAMUSCULAR; SUBCUTANEOUS
Qty: 3 VIAL | Refills: 1
Start: 2020-03-16 | End: 2020-03-23 | Stop reason: SDUPTHER

## 2020-03-16 RX ORDER — PEN NEEDLE, DIABETIC 30 GX 1/3"
30 NEEDLE, DISPOSABLE MISCELLANEOUS DAILY
Qty: 30 PEN NEEDLE | Refills: 2 | Status: SHIPPED | OUTPATIENT
Start: 2020-03-16 | End: 2020-06-17 | Stop reason: SDUPTHER

## 2020-03-16 NOTE — PROGRESS NOTES
Chief Complaint   Patient presents with    Hypertension    Diabetes     HPI:  The patient is a 62 y.o. female who presents today for a follow up appointment. No hospital, ER or specialist visits since last primary care visit except as noted below. She moved here for work, [place]. Her sister is Tori Carballo, another patient of mine. She works for [employer] and . Tingling: For approximately 1 year she reports numbness and tingling in both feet, worse in the left, worse at night but not every night. She occasionally has some throbbing pain in the arch of her foot, very infrequent. She has tried Aleve, Ibuprofen infrequently. Her feet feel full, heavy at night when they are hurting. She reports sensitivity with touching her feet. Her symptoms have really worsened over the past year. This has improved. She no longer has burning pain but she has tingling and some pedal edema at the end of the day. HTN:  No hx of HTN until now. Denies CP, SOB. Does report some intermittent CHRIS, improved but still has pedal edema. Denies HA. She was started on Lisinopril 10mg on 2/4/2020. Her home blood pressure readings have been in the 110s - 70s. BP Readings from Last 3 Encounters:   03/16/20 131/64   02/04/20 150/75   01/21/20 (!) 163/93     Diabetes:  Diagnosed in 2/2020. She was started on Metformin at her last visit, she has advanced to TID but most often does not remember to take her middle of the day dose. She reports being constipated. She is checking her blood glucose at home, they have been running 120s-140s, she is checking these in the evenings. This range is with taking Metformin TID. She is also on Weight Watchers with her sister. Her weight today is 250.3 pounds with a BMI 40.38. She has lost 10 pounds.     Lab Results   Component Value Date/Time    Hemoglobin A1c 10.7 (H) 01/23/2020 08:13 AM     Lab Results   Component Value Date/Time    Sodium 134 01/23/2020 08:13 AM    Potassium 4.7 01/23/2020 08:13 AM    Chloride 94 (L) 01/23/2020 08:13 AM    CO2 20 01/23/2020 08:13 AM    Glucose 306 (H) 01/23/2020 08:13 AM    BUN 15 01/23/2020 08:13 AM    Creatinine 0.51 (L) 01/23/2020 08:13 AM    BUN/Creatinine ratio 29 (H) 01/23/2020 08:13 AM    GFR est  01/23/2020 08:13 AM    GFR est non- 01/23/2020 08:13 AM    Calcium 9.5 01/23/2020 08:13 AM    Bilirubin, total 0.7 01/23/2020 08:13 AM    AST (SGOT) 26 01/23/2020 08:13 AM    Alk. phosphatase 170 (H) 01/23/2020 08:13 AM    Protein, total 7.2 01/23/2020 08:13 AM    Albumin 3.5 (L) 01/23/2020 08:13 AM    A-G Ratio 0.9 (L) 01/23/2020 08:13 AM    ALT (SGPT) 39 (H) 01/23/2020 08:13 AM     XOL:  She was diagnosed with hypercholesterolemia in 2/2020. She is taking 20mg Atorvastatin daily at this time. She will need recheck in the next week. Lab Results   Component Value Date/Time    Cholesterol, total 454 (H) 01/23/2020 08:13 AM    HDL Cholesterol 28 (L) 01/23/2020 08:13 AM    LDL, calculated Comment 01/23/2020 08:13 AM    VLDL, calculated Comment 01/23/2020 08:13 AM    Triglyceride 1,466 ([facility]) 01/23/2020 08:13 AM     Vitamin D Def: She is taking 50K Vitamin D2 weekly, she will need recheck in 5/2020. Lab Results   Component Value Date/Time    VITAMIN D, 25-HYDROXY 14.1 (L) 01/23/2020 08:13 AM       Vitamin B12 Def: She is doing once monthly Vitamin B12 injections. She will need a recheck in 5/2020. Lab Results   Component Value Date/Time    Vitamin B12 286 01/23/2020 08:13 AM     Review of Systems  A comprehensive review of systems was negative except for that written in the HPI.     Patient Active Problem List   Diagnosis Code    Obesity, morbid ([facility] [address] E66.01    Carpal tunnel syndrome of right wrist G56.01    Numbness and tingling R20.0, R20.2    Controlled type 2 diabetes mellitus without complication, without long-term current use of insulin (HCC) E11.9    Hypercholesterolemia with hypertriglyceridemia E78.2    Vitamin D deficiency E55.9    Vitamin B12 deficiency E53.8    Elevated hemoglobin (HCC) D58.2    Essential hypertension I10    Pedal edema R60.0    Drug-induced constipation K59.03       Past Medical History:   Diagnosis Date    Carpal tunnel syndrome of right wrist     Cellulitis and abscess of left lower extremity     Diabetes ([facility] [address]     Hypercholesterolemia     Hypertension        History reviewed. No pertinent surgical history. Social History     Tobacco Use    Smoking status: Never Smoker    Smokeless tobacco: Never Used   Substance Use Topics    Alcohol use: Not Currently    Drug use: Never       Family History   Problem Relation Age of Onset    Other Mother         osteopenia    Arthritis-osteo Mother     Hypertension Mother     Diabetes Mother     Hypertension Father     Diabetes Father     Heart Disease Father     Hypertension Sister        Outpatient Medications Marked as Taking for the 3/16/20 encounter (Office Visit) with Manuel Carrasco NP   Medication Sig Dispense Refill    pen needle, diabetic (NovoTwist) 32 gauge x 1/5\" ndle 30 UNSPECIFIED by Does Not Apply route daily. 30 Pen Needle 2    liraglutide (VICTOZA) 0.6 mg/0.1 mL (18 mg/3 mL) pnij Inject 0.6mg daily x 7 days, then increase to 1.2mg daily x 7 days. 5 Adjustable Dose Pre-filled Pen Syringe 1    metFORMIN (GLUCOPHAGE) 500 mg tablet Take 1 Tab by mouth two (2) times daily (with meals). 60 Tab 2    cyanocobalamin (VITAMIN B12) 1,000 mcg/mL injection 1 mL by IntraMUSCular route every thirty (30) days. 3 Vial 1    Syringe with Needle, Safety (BD Integra Syringe) 3 mL 25 gauge x 5/8\" syrg Use as directed monthly 3 Pen Needle 1    ergocalciferol (ERGOCALCIFEROL) 1,250 mcg (50,000 unit) capsule Take 1 Cap by mouth every seven (7) days. 5 Cap 2    Blood-Glucose Meter monitoring kit For use to check blood sugar once a day in morning before eating.  Please dispense brand covered by insurance. 1 Kit 0    glucose blood VI test strips (ASCENSIA AUTODISC VI, ONE TOUCH ULTRA TEST VI) strip For use with glucometer to check blood sugar once a day in morning before eating. Please dispense brand covered by insurance. 100 Strip 2    lancets misc For use with glucometer to check blood sugar once a day in morning before eating. Please dispense brand covered by insurance. 1 Each 11    atorvastatin (LIPITOR) 20 mg tablet Take 1 Tab by mouth daily. 30 Tab 1    lisinopril (PRINIVIL, ZESTRIL) 10 mg tablet Take 1 Tab by mouth daily. 90 Tab 1    multivitamin (ONE A DAY) tablet Take 1 Tab by mouth daily. Current Outpatient Medications on File Prior to Visit   Medication Sig Dispense Refill    Blood-Glucose Meter monitoring kit For use to check blood sugar once a day in morning before eating. Please dispense brand covered by insurance. 1 Kit 0    glucose blood VI test strips (ASCENSIA AUTODISC VI, ONE TOUCH ULTRA TEST VI) strip For use with glucometer to check blood sugar once a day in morning before eating. Please dispense brand covered by insurance. 100 Strip 2    lancets misc For use with glucometer to check blood sugar once a day in morning before eating. Please dispense brand covered by insurance. 1 Each 11    atorvastatin (LIPITOR) 20 mg tablet Take 1 Tab by mouth daily. 30 Tab 1    lisinopril (PRINIVIL, ZESTRIL) 10 mg tablet Take 1 Tab by mouth daily. 90 Tab 1    multivitamin (ONE A DAY) tablet Take 1 Tab by mouth daily.  [DISCONTINUED] metFORMIN (GLUCOPHAGE) 500 mg tablet Take 2 Tabs by mouth two (2) times daily (with meals). (Patient taking differently: Take 1,000 mg by mouth two (2) times daily (with meals). Take 2-3 tablets a day .) 120 Tab 0    [DISCONTINUED] ergocalciferol (ERGOCALCIFEROL) 1,250 mcg (50,000 unit) capsule Take 1 Cap by mouth every seven (7) days.  5 Cap 2    [DISCONTINUED] cyanocobalamin (VITAMIN B12) 1,000 mcg/mL injection 1 mL by IntraMUSCular route every thirty (30) days. 3 Vial 1    [DISCONTINUED] Syringe with Needle, Safety (BD INTEGRA SYRINGE) 3 mL 25 gauge x 5/8\" syrg Use as directed monthly 3 Pen Needle 1    ibuprofen (MOTRIN) 200 mg tablet Take 200 mg by mouth every eight (8) hours as needed for Pain. No current facility-administered medications on file prior to visit. Allergies   Allergen Reactions    Penicillins Atopic Dermatitis    Tetanus And Diphtheria Toxoids Unknown (comments)     In childhood       PE:  Visit Vitals  /64 (BP 1 Location: Left arm, BP Patient Position: Sitting)   Pulse 73   Temp 96.6 °F (35.9 °C) (Oral)   Resp 16   Ht 5' 6\" (1.676 m)   Wt 250 lb 3.2 oz (113.5 kg)   SpO2 96%   BMI 40.38 kg/m²       Gen: alert, oriented, no acute distress  Head: normocephalic, atraumatic  Ears: external auditory canals clear, TMs without erythema or effusion  Eyes: pupils equal round reactive to light, sclera clear, conjunctiva clear  Oral: moist mucus membranes, no oral lesions, no pharyngeal inflammation or exudate  Neck: symmetric normal sized thyroid, no carotid bruits, no jugular vein distention  Resp: no increase work of breathing, lungs clear to ausculation bilaterally, no wheezing, rales or rhonchi  CV: S1, S2 normal.  No murmurs, rubs, or gallops. Abd: soft, not tender, not distended. No hepatosplenomegaly. Normal bowel sounds. No hernias. No abdominal or renal bruits. Neuro: cranial nerves intact, normal strength and movement in all extremities, reflexes and sensation intact and symmetric. Skin: no lesion or rash  Extremities: no cyanosis or edema    No results found for this visit on 03/16/20. Assessment/Plan:    ICD-10-CM ICD-9-CM    1. Essential hypertension I10 401.9    2. Controlled type 2 diabetes mellitus without complication, without long-term current use of insulin (HCC) E11.9 250.00 MICROALBUMIN, UR, RAND W/ MICROALB/CREAT RATIO      metFORMIN (GLUCOPHAGE) 500 mg tablet   3.  Numbness and tingling R20.0 782.0     R20.2     4. Pedal edema R60.0 782.3 MICROALBUMIN, UR, RAND W/ MICROALB/CREAT RATIO   5. Vitamin B12 deficiency E53.8 266.2 cyanocobalamin (VITAMIN B12) 1,000 mcg/mL injection      Syringe with Needle, Safety (BD Integra Syringe) 3 mL 25 gauge x 5/8\" syrg   6. Vitamin D deficiency E55.9 268.9 ergocalciferol (ERGOCALCIFEROL) 1,250 mcg (50,000 unit) capsule   7. Hypercholesterolemia with hypertriglyceridemia E78.2 272.2 LIPID PANEL   8. Obesity, morbid ([address] E66.01 278.01    9. Drug-induced constipation K59.03 564.09 pen needle, diabetic (NovoTwist) 32 gauge x 1/5\" ndle     E980.5 liraglutide (VICTOZA) 0.6 mg/0.1 mL (18 mg/3 mL) pnij   10. Encounter for hepatitis C screening test for low risk patient Z11.59 V73.89 HEPATITIS C AB     Diagnoses and all orders for this visit:    1. Essential hypertension    2. Controlled type 2 diabetes mellitus without complication, without long-term current use of insulin (HCC)  -     MICROALBUMIN, UR, RAND W/ MICROALB/CREAT RATIO; Future  -     metFORMIN (GLUCOPHAGE) 500 mg tablet; Take 1 Tab by mouth two (2) times daily (with meals). 3. Numbness and tingling    4. Pedal edema  -     MICROALBUMIN, UR, RAND W/ MICROALB/CREAT RATIO; Future    5. Vitamin B12 deficiency  -     cyanocobalamin (VITAMIN B12) 1,000 mcg/mL injection; 1 mL by IntraMUSCular route every thirty (30) days. -     Syringe with Needle, Safety (BD Integra Syringe) 3 mL 25 gauge x 5/8\" syrg; Use as directed monthly    6. Vitamin D deficiency  -     ergocalciferol (ERGOCALCIFEROL) 1,250 mcg (50,000 unit) capsule; Take 1 Cap by mouth every seven (7) days. 7. Hypercholesterolemia with hypertriglyceridemia  -     LIPID PANEL; Future    8. Obesity, morbid ([address]    9. Drug-induced constipation  -     pen needle, diabetic (NovoTwist) 32 gauge x 1/5\" ndle; 30 UNSPECIFIED by Does Not Apply route daily. -     liraglutide (VICTOZA) 0.6 mg/0.1 mL (18 mg/3 mL) pnij;  Inject 0.6mg daily x 7 days, then increase to 1.2mg daily x 7 days. 10. Encounter for hepatitis C screening test for low risk patient  -     HEPATITIS C AB; Future      Follow-up and Dispositions    · Return in about 4 weeks (around 4/13/2020) for Medication Check, Diabetes, Hypertension, XOL, Vitamin D deficiency, Vitamin B12 Deficiency. lab results and schedule of future lab studies reviewed with patient  reviewed diet, exercise and weight control  reviewed medications and side effects in detail    lose weight, increase physical activity, call if any problems    Health Maintenance reviewed - deferred to next visit. Recommended healthy diet low in carbohydrates, fats, sodium and cholesterol. Recommended regular cardiovascular exercise 3-6 times per week for 30-60 minutes daily. Chart is reviewed and updated today in the office. Records requested for other providers patient has seen and is currently seeing. Patient was offered a choice/choices in the treatment plan today. Patient expresses understanding of the plan and agrees with recommendations. Verbal and written instructions (see AVS) provided. See patient instructions for more. Patient expresses understanding of diagnosis and treatment plan.

## 2020-03-16 NOTE — PATIENT INSTRUCTIONS
Constipation: Care Instructions Your Care Instructions Constipation means that you have a hard time passing stools (bowel movements). People pass stools from 3 times a day to once every 3 days. What is normal for you may be different. Constipation may occur with pain in the rectum and cramping. The pain may get worse when you try to pass stools. Sometimes there are small amounts of bright red blood on toilet paper or the surface of stools. This is because of enlarged veins near the rectum (hemorrhoids). A few changes in your diet and lifestyle may help you avoid ongoing constipation. Your doctor may also prescribe medicine to help loosen your stool. Some medicines can cause constipation. These include pain medicines and antidepressants. Tell your doctor about all the medicines you take. Your doctor may want to make a medicine change to ease your symptoms. Follow-up care is a key part of your treatment and safety. Be sure to make and go to all appointments, and call your doctor if you are having problems. It's also a good idea to know your test results and keep a list of the medicines you take. How can you care for yourself at home? · Drink plenty of fluids, enough so that your urine is light yellow or clear like water. If you have kidney, heart, or liver disease and have to limit fluids, talk with your doctor before you increase the amount of fluids you drink. · Include high-fiber foods in your diet each day. These include fruits, vegetables, beans, and whole grains. · Get at least 30 minutes of exercise on most days of the week. Walking is a good choice. You also may want to do other activities, such as running, swimming, cycling, or playing tennis or team sports. · Take a fiber supplement, such as Citrucel or Metamucil, every day. Read and follow all instructions on the label. · Schedule time each day for a bowel movement. A daily routine may help. Take your time having your bowel movement. · Support your feet with a small step stool when you sit on the toilet. This helps flex your hips and places your pelvis in a squatting position. · Your doctor may recommend an over-the-counter laxative to relieve your constipation. Examples are Milk of Magnesia and MiraLax. Read and follow all instructions on the label. Do not use laxatives on a long-term basis. When should you call for help? Call your doctor now or seek immediate medical care if: 
  · You have new or worse belly pain.  
  · You have new or worse nausea or vomiting.  
  · You have blood in your stools.  
 Watch closely for changes in your health, and be sure to contact your doctor if: 
  · Your constipation is getting worse.  
  · You do not get better as expected. Where can you learn more? Go to http://lis-zena.info/ Enter 21 [phone] in the search box to learn more about \"Constipation: Care Instructions. \" Current as of: June 26, 2019Content Version: 12.4 © 3186-9861 Healthwise, Incorporated. Care instructions adapted under license by [employer] (which disclaims liability or warranty for this information). If you have questions about a medical condition or this instruction, always ask your healthcare professional. [place] [address] any warranty or liability for your use of this information. Type 2 Diabetes: Care Instructions Your Care Instructions Type 2 diabetes is a disease that develops when the body's tissues cannot use insulin properly. Over time, the pancreas cannot make enough insulin. Insulin is a hormone that helps the body's cells use sugar (glucose) for energy. It also helps the body store extra sugar in muscle, fat, and liver cells. Without insulin, the sugar cannot get into the cells to do its work. It stays in the blood instead. This can cause high blood sugar levels.  A person has diabetes when the blood sugar stays too high too much of the time. Over time, diabetes can lead to diseases of the heart, blood vessels, nerves, kidneys, and eyes. You may be able to control your blood sugar by losing weight, eating a healthy diet, and getting daily exercise. You may also have to take insulin or other diabetes medicine. Follow-up care is a key part of your treatment and safety. Be sure to make and go to all appointments. Call your doctor if you are having problems. It's also a good idea to know your test results and keep a list of the medicines you take. How can you care for yourself at home? · Keep your blood sugar at a target level (which you set with your doctor). ? Eat a good diet that spreads carbohydrate throughout the day. Carbohydratethe body's main source of fuelaffects blood sugar more than any other nutrient. Carbohydrate is in fruits, vegetables, milk, and yogurt. It also is in breads, cereals, vegetables such as potatoes and corn, and sugary foods such as candy and cakes. ? Aim for 30 minutes of exercise on most, preferably all, days of the week. Walking is a good choice. You also may want to do other activities, such as running, swimming, cycling, or playing tennis or team sports. If your doctor says it's okay, do muscle-strengthening exercises at least 2 times a week. ? Take your medicines exactly as prescribed. Call your doctor if you think you are having a problem with your medicine. You will get more details on the specific medicines your doctor prescribes. · Check your blood sugar as often as your doctor recommends. It is important to keep track of any symptoms you have, such as low blood sugar. Also tell your doctor if you have any changes in your activities, diet, or insulin use. · Talk to your doctor before you start taking aspirin every day. Aspirin can help certain people lower their risk of a heart attack or stroke. But taking aspirin isn't right for everyone, because it can cause serious bleeding. · Do not smoke. If you need help quitting, talk to your doctor about stop-smoking programs and medicines. These can increase your chances of quitting for good. · Keep your cholesterol and blood pressure at normal levels. You may need to take one or more medicines to reach your goals. Take them exactly as directed. Do not stop or change a medicine without talking to your doctor first. 
When should you call for help? Call 911 anytime you think you may need emergency care. For example, call if: 
  · You passed out (lost consciousness), or you suddenly become very sleepy or confused. (You may have very low blood sugar.)  
 Call your doctor now or seek immediate medical care if: 
  · Your blood sugar is 300 mg/dL or is higher than the level your doctor has set for you.  
  · You have symptoms of low blood sugar, such as: ? Sweating. ? Feeling nervous, shaky, and weak. ? Extreme hunger and slight nausea. ? Dizziness and headache. 
? Blurred vision. ? Confusion.  
 Watch closely for changes in your health, and be sure to contact your doctor if: 
  · You often have problems controlling your blood sugar.  
  · You have symptoms of long-term diabetes problems, such as: ? New vision changes. ? New pain, numbness, or tingling in your hands or feet. ? Skin problems. Where can you learn more? Go to http://lis-zena.info/ Enter C553 in the search box to learn more about \"Type 2 Diabetes: Care Instructions. \" Current as of: December 19, 2019Content Version: 12.4 © 6533-6313 Healthwise, Incorporated. Care instructions adapted under license by [employer] (which disclaims liability or warranty for this information). If you have questions about a medical condition or this instruction, always ask your healthcare professional. [address] any warranty or liability for your use of this information. Constipation: Constipation is a common problem for many patients. Many medications, lower daily  fluid intake, etc may cause acute constipation. Constipation should be relieved in most cases by one or more of the following suggestions: 
 
1. Increase your daily fluid intake. Aim for a minimum of 48-64oz daily. 2.  Increased mobility. Move as much as possible. Walking not only improves circulation but it is also helpful for gastrointestinal motility. 3.  Stool softeners (non habit forming). A.  Colace (Docusate) - Over the Counter, may take 100mg capsule up to 3 times daily for a maximum of 300mg daily. 4.  Laxatives - If stool softeners are not providing relief OR if you have not had a BM in 3 or more days. It is essential that you Laxative use for extended periods of time may be habit forming. Please ask your provider if you are concerned about how long you have been taking them. A.  Miralax - mix 1 capful into any drink of your choice daily for up to 7 days. Can increase to two capfuls a day 
 OR 
 B. Milk of Magnesia - Take 30mL (2 Tbsp) to 60mL (4Tbsp) daily. OR 
 C. Senna (PUSH) (senokot) 8.5mg tablets. Take 1-2 every night. 5.  Rectal Suppositories/Fleets Enema - When the use of stool softeners or laxatives have been ineffective in relieving your constipation symptoms you may find a rectal suppository or fleets enema effective. Occasionally stool has hardened at the base of the rectum making the above interventions ineffective. A suppository or enema can remove this hardened stool so the above interventions will then be effective. GOAL: one soft bowel movement daily If the above methods are ineffective, please call our office to schedule an appointment to discuss next steps to help you relieve your constipation. You may require a prescription medication to eliminate your constipation.

## 2020-03-16 NOTE — PROGRESS NOTES
Room: 6    Identified pt with two pt identifiers(name and ). Reviewed record in preparation for visit and have obtained necessary documentation. All patient medications has been reviewed. Chief Complaint   Patient presents with    Hypertension    Diabetes       Health Maintenance Due   Topic    Hepatitis C Screening     Pneumococcal 0-64 years (1 of 1 - PPSV23)    Foot Exam Q1     MICROALBUMIN Q1     Eye Exam Retinal or Dilated     DTaP/Tdap/Td series (1 - Tdap)    PAP AKA CERVICAL CYTOLOGY     Shingrix Vaccine Age 50> (1 of 2)    Breast Cancer Screen Mammogram     FOBT Q1Y Age 54-65      Pap: Pt states her last pap was more than 15 years ago     Pneumonia: accepted pt , will receive vaccine today     Mammogram: Never had one done    Vitals:    20 1435   PainSc:   0 - No pain       1. Have you been to the ER, urgent care clinic since your last visit? Hospitalized since your last visit? No    2. Have you seen or consulted any other health care providers outside of the [address] [place] [address] since your last visit? Include any pap smears or colon screening. No    3. Would you like to receive your flu shot today? NO    4. Are you fasting for blood work today? NO    Patient is accompanied by self I have received verbal consent from Steffen Bailey to discuss any/all medical information while they are present in the room.

## 2020-03-17 DIAGNOSIS — Z11.59 ENCOUNTER FOR HEPATITIS C SCREENING TEST FOR LOW RISK PATIENT: ICD-10-CM

## 2020-03-17 DIAGNOSIS — E78.2 HYPERCHOLESTEROLEMIA WITH HYPERTRIGLYCERIDEMIA: ICD-10-CM

## 2020-03-17 DIAGNOSIS — E11.9 CONTROLLED TYPE 2 DIABETES MELLITUS WITHOUT COMPLICATION, WITHOUT LONG-TERM CURRENT USE OF INSULIN (HCC): ICD-10-CM

## 2020-03-17 DIAGNOSIS — R60.0 PEDAL EDEMA: ICD-10-CM

## 2020-03-19 PROBLEM — R80.9 MICROALBUMINURIA: Status: ACTIVE | Noted: 2020-03-19

## 2020-03-19 LAB
ALBUMIN/CREAT UR: 139 MG/G CREAT (ref 0–29)
CHOLEST SERPL-MCNC: 153 MG/DL (ref 100–199)
CREAT UR-MCNC: 173 MG/DL
HCV AB S/CO SERPL IA: <0.1 S/CO RATIO (ref 0–0.9)
HDLC SERPL-MCNC: 33 MG/DL
INTERPRETATION, 910389: NORMAL
LDLC SERPL CALC-MCNC: 60 MG/DL (ref 0–99)
Lab: NORMAL
MICROALBUMIN UR-MCNC: 239.8 UG/ML
TRIGL SERPL-MCNC: 300 MG/DL (ref 0–149)
VLDLC SERPL CALC-MCNC: 60 MG/DL (ref 5–40)

## 2020-03-20 NOTE — PROGRESS NOTES
Your lab results have been reviewed and are as follows:    Cholesterol Panel: Much Improved! Your triglycerides should come down when your blood sugar comes down a bit more. I would recommend we recheck your cholesterol in 2-3 months. Total Cholesterol is 153 (goal <200). Triglycerides are 300. (goal <150)  Your HDL or \"good\" cholesterol is 33. (goal >40). Your LDL or \"bad\" cholesterol is 60.  (goal <100). Your diabetic kidney test is elevated. We will recheck this in 3 months as well. If no improvement is seen at that time I would like to have you see a kidney doctor for additional workup. Your hepatitis C screening test was negative. Deborah Lott, NAYELI, MHA, FNP-BC

## 2020-03-22 DIAGNOSIS — I10 ESSENTIAL HYPERTENSION: ICD-10-CM

## 2020-03-22 DIAGNOSIS — E78.2 HYPERCHOLESTEROLEMIA WITH HYPERTRIGLYCERIDEMIA: ICD-10-CM

## 2020-03-22 DIAGNOSIS — E53.8 VITAMIN B12 DEFICIENCY: ICD-10-CM

## 2020-03-22 DIAGNOSIS — E55.9 VITAMIN D DEFICIENCY: ICD-10-CM

## 2020-03-23 ENCOUNTER — DOCUMENTATION ONLY (OUTPATIENT)
Dept: FAMILY MEDICINE CLINIC | Age: 55
End: 2020-03-23

## 2020-03-23 RX ORDER — NEEDLES, FILTER 19GX1 1/2"
NEEDLE, DISPOSABLE MISCELLANEOUS
Qty: 3 PEN NEEDLE | Refills: 1 | Status: SHIPPED | OUTPATIENT
Start: 2020-03-23 | End: 2021-02-16 | Stop reason: SDUPTHER

## 2020-03-23 RX ORDER — LISINOPRIL 10 MG/1
10 TABLET ORAL DAILY
Qty: 90 TAB | Refills: 1 | Status: SHIPPED | OUTPATIENT
Start: 2020-03-23 | End: 2020-10-12 | Stop reason: SDUPTHER

## 2020-03-23 RX ORDER — ATORVASTATIN CALCIUM 20 MG/1
20 TABLET, FILM COATED ORAL DAILY
Qty: 30 TAB | Refills: 1 | Status: SHIPPED | OUTPATIENT
Start: 2020-03-23 | End: 2020-06-03

## 2020-03-23 RX ORDER — CYANOCOBALAMIN 1000 UG/ML
1000 INJECTION, SOLUTION INTRAMUSCULAR; SUBCUTANEOUS
Qty: 3 VIAL | Refills: 1
Start: 2020-03-23 | End: 2020-06-02 | Stop reason: SDUPTHER

## 2020-03-23 RX ORDER — ERGOCALCIFEROL 1.25 MG/1
50000 CAPSULE ORAL
Qty: 5 CAP | Refills: 2 | Status: SHIPPED | OUTPATIENT
Start: 2020-03-23 | End: 2020-05-04 | Stop reason: SDUPTHER

## 2020-03-25 ENCOUNTER — TELEPHONE (OUTPATIENT)
Dept: FAMILY MEDICINE CLINIC | Age: 55
End: 2020-03-25

## 2020-03-25 DIAGNOSIS — R80.9 MICROALBUMINURIA: ICD-10-CM

## 2020-03-25 DIAGNOSIS — K59.03 DRUG-INDUCED CONSTIPATION: ICD-10-CM

## 2020-03-25 DIAGNOSIS — E66.01 OBESITY, MORBID (HCC): ICD-10-CM

## 2020-03-25 DIAGNOSIS — E11.65 TYPE 2 DIABETES MELLITUS WITH HYPERGLYCEMIA, WITHOUT LONG-TERM CURRENT USE OF INSULIN (HCC): ICD-10-CM

## 2020-03-25 DIAGNOSIS — E11.9 CONTROLLED TYPE 2 DIABETES MELLITUS WITHOUT COMPLICATION, WITHOUT LONG-TERM CURRENT USE OF INSULIN (HCC): Primary | ICD-10-CM

## 2020-03-25 NOTE — TELEPHONE ENCOUNTER
----- Message from Joanne Soto sent at 3/17/2020  8:22 AM EDT -----  Regarding: EMI Ham/telephone  . General Message/Vendor Calls    Caller's first and last name: Conner with [facility]      Reason for call: states the Liraglutide was written inject 0.6 mg daily for 7 days,then increase to 1.2 mg daily for 7 days. However, because of the directions it will only last a month. Also is the dx code correct. ?      Callback required yes/no and why:yes      Best contact number(s):[phone]      Details to clarify the request:      Joanne Pradokomal Soto         .

## 2020-03-25 NOTE — TELEPHONE ENCOUNTER
Escribed new prescription for Victoza with the appropriate diagnosis codes. The original prescription should have read. Inject 0.6mg daily x 7 days then increase to 1.2mg injected daily from then on. So assuming she has started the medication already she should be injecting 1.2mg daily which is how this 2nd prescription is written. Please notify the patient and let her know I have updated and resent the prescription.   Thanks, Barbara

## 2020-03-26 NOTE — TELEPHONE ENCOUNTER
Called returned to Conner/ [address] [place] [address] Pharmacist/ [phone] and advised of correct diagnosis and Rx. Per Conner that 5 pens adjustable were written to dispense. With the directions pt will only need 2 pens to last a month and will dispense 2 pens.

## 2020-05-04 ENCOUNTER — VIRTUAL VISIT (OUTPATIENT)
Dept: FAMILY MEDICINE CLINIC | Age: 55
End: 2020-05-04

## 2020-05-04 DIAGNOSIS — R20.2 NUMBNESS AND TINGLING: ICD-10-CM

## 2020-05-04 DIAGNOSIS — E11.65 TYPE 2 DIABETES MELLITUS WITH HYPERGLYCEMIA, WITHOUT LONG-TERM CURRENT USE OF INSULIN (HCC): ICD-10-CM

## 2020-05-04 DIAGNOSIS — D58.2 ELEVATED HEMOGLOBIN (HCC): ICD-10-CM

## 2020-05-04 DIAGNOSIS — E78.2 HYPERCHOLESTEROLEMIA WITH HYPERTRIGLYCERIDEMIA: ICD-10-CM

## 2020-05-04 DIAGNOSIS — R20.0 NUMBNESS AND TINGLING: ICD-10-CM

## 2020-05-04 DIAGNOSIS — G62.9 NEUROPATHY: ICD-10-CM

## 2020-05-04 DIAGNOSIS — R74.8 ELEVATED LIVER ENZYMES: ICD-10-CM

## 2020-05-04 DIAGNOSIS — E53.8 VITAMIN B12 DEFICIENCY: ICD-10-CM

## 2020-05-04 DIAGNOSIS — E11.65 TYPE 2 DIABETES MELLITUS WITH HYPERGLYCEMIA, WITHOUT LONG-TERM CURRENT USE OF INSULIN (HCC): Primary | ICD-10-CM

## 2020-05-04 DIAGNOSIS — R80.9 MICROALBUMINURIA: ICD-10-CM

## 2020-05-04 DIAGNOSIS — Z11.59 ENCOUNTER FOR HEPATITIS C SCREENING TEST FOR LOW RISK PATIENT: ICD-10-CM

## 2020-05-04 DIAGNOSIS — E55.9 VITAMIN D DEFICIENCY: ICD-10-CM

## 2020-05-04 DIAGNOSIS — I10 ESSENTIAL HYPERTENSION: ICD-10-CM

## 2020-05-04 DIAGNOSIS — E11.9 CONTROLLED TYPE 2 DIABETES MELLITUS WITHOUT COMPLICATION, WITHOUT LONG-TERM CURRENT USE OF INSULIN (HCC): ICD-10-CM

## 2020-05-04 RX ORDER — ERGOCALCIFEROL 1.25 MG/1
50000 CAPSULE ORAL
Qty: 13 CAP | Refills: 1 | Status: SHIPPED | OUTPATIENT
Start: 2020-05-04 | End: 2021-10-14 | Stop reason: ALTCHOICE

## 2020-05-04 RX ORDER — DULOXETIN HYDROCHLORIDE 30 MG/1
30 CAPSULE, DELAYED RELEASE ORAL DAILY
Qty: 90 CAP | Refills: 1 | Status: SHIPPED | OUTPATIENT
Start: 2020-05-04 | End: 2021-02-16 | Stop reason: SDUPTHER

## 2020-05-04 RX ORDER — METFORMIN HYDROCHLORIDE 1000 MG/1
1000 TABLET ORAL 2 TIMES DAILY WITH MEALS
Qty: 180 TAB | Refills: 1 | Status: SHIPPED | OUTPATIENT
Start: 2020-05-04 | End: 2020-11-12

## 2020-05-04 NOTE — PROGRESS NOTES
Rodrigue Odmo is a 62 y.o. female who was seen by synchronous (real-time) audio-video technology on 5/4/2020. Consent: Rodrigue Odom, who was seen by synchronous (real-time) audio-video technology, and/or her healthcare decision maker, is aware that this patient-initiated, Telehealth encounter on 5/4/2020 is a billable service, with coverage as determined by her insurance carrier. She is aware that she may receive a bill and has provided verbal consent to proceed: Yes. Assessment & Plan:   Diagnoses and all orders for this visit:    1. Type 2 diabetes mellitus with hyperglycemia, without long-term current use of insulin (HCC)  -     metFORMIN (GLUCOPHAGE) 1,000 mg tablet; Take 1 Tab by mouth two (2) times daily (with meals). (THIS IS A NEW AND HIGHER DOSE. TO REPLACE  MG TAB)  -     DULoxetine (CYMBALTA) 30 mg capsule; Take 1 Cap by mouth daily. -A1C 10.7% on 1/23/2020 at diagnosis  -now taking metformin 500 mg BID (dropped from TID at last visit 3/16/2020), and Victoza 1.2 mg daily (started 3/16/2020)  -reports BS averaging 144 on home readings, fasting AM checks  -will increase metformin to 1000 mg BID with meals, will first increase to 500 mg qAM and 1000 mg qPM x 1 wk, then 1000 mg BID and stay at this dose  -due for A1C check but can not due to COVID-19, will mail order to have labs done when stay at home order is lifted  -on ACE-I and statin  -+microalbuminuria     2. Microalbuminuria  139 on 3/18/202  Now on ACE-I  Recheck Sept 2020    3. Hypercholesterolemia with hypertriglyceridemia  -started atorvastatin 20 mg in Feb 2020; TG were 1,466 at onset of DM diagnosis, now  at last check 3/18/2020.  -continue current therapy  -recheck late summer    4. Essential hypertension  -well controlled by her reports of home readings  -continue lisinopril 10 mg daily    5. Vitamin D deficiency  -     ergocalciferol (ERGOCALCIFEROL) 1,250 mcg (50,000 unit) capsule;  Take 1 Cap by mouth every seven (7) days. -Vit D 14.1 on 1/24/2020, started ergocalciferol 50K weekly in Feb 2020  -will continue ergocalciferol for another 6 months, then recheck labs, and maintain on daily Vit D3 2000 units daily once normalized      6. Numbness and tingling  -     DULoxetine (CYMBALTA) 30 mg capsule; Take 1 Cap by mouth daily. -burning in feet stopped within 3 days of starting cymbalta in Feb 2020  -continue current therapy    7. Neuropathy  -     DULoxetine (CYMBALTA) 30 mg capsule; Take 1 Cap by mouth daily.  -controlled on Cymbalta, continue current regimen    8. Elevated liver enzymes  ALT 39, Alk Phos 170; in setting of newly diagnosed uncontrolled DM, consider fatty liver  Recheck with next labs    Order form mailed for labs    Around Sept, check microalbumin and lipids    Orders Placed This Encounter    METABOLIC PANEL, COMPREHENSIVE     Standing Status:   Future     Standing Expiration Date:   10/4/2020    HEMOGLOBIN A1C WITH EAG     Standing Status:   Future     Standing Expiration Date:   10/4/2020    metFORMIN (GLUCOPHAGE) 1,000 mg tablet     Sig: Take 1 Tab by mouth two (2) times daily (with meals). (THIS IS A NEW AND HIGHER DOSE. TO REPLACE  MG TAB)     Dispense:  180 Tab     Refill:  1    ergocalciferol (ERGOCALCIFEROL) 1,250 mcg (50,000 unit) capsule     Sig: Take 1 Cap by mouth every seven (7) days. Dispense:  13 Cap     Refill:  1    DULoxetine (CYMBALTA) 30 mg capsule     Sig: Take 1 Cap by mouth daily. Dispense:  90 Cap     Refill:  1       Follow-up and Dispositions    Return in about 3 months (around 8/4/2020) for HTN, DM. I spent at least 23 minutes on this visit with this established patient. [phone]  Subjective:   Alexandro Mcclure is a 62 y.o. female who was seen for Follow-up ([facility] DM) and Establish Care    Former patient of Sara Adam NP, who has recently left the practice, and patient is seeing me to establish care.  Last visit with Elin Nguyen NP on 3/16/2020 She just started seeing Barbara earlier this year, and prior to that had not been to a doctor in about 20 yrs. DM: Diagnosed 1/2020. Checks blood glucose each morning and usually , average is 144. Takes medications as directed. On metformin BID (dropped from TID at last visit 3/16/2020), and Victoza 1.2 mg daily (started 3/16/2020) No polyuria/polydipsia. No tingling or numbness in feet since starting Cymbalta. Unable to perform monofilament exam since this is a virtual visit. Last A1C 10.7% on 1/23/2020. Labs on 3/18/2020 showed + microalbuminuria. At diagnosis had very high TG 1,466. On statin and ACE-I. See below. She is still doing Weight Watchers, and states her weight is down to 235 lbs, from last visit was 250 lbs! Great job! Lab Results   Component Value Date/Time    Hemoglobin A1c 10.7 (H) 01/23/2020 08:13 AM         Hypertension - newly diagnosed 1/2020. compliant with medication, on lisinopril 10 mg daily,  home monitoring averages 126/77, pulse 85. No history of heart disease or stroke. No chest pain, no shortness of breath, no headaches, no lower extremity swelling. CHL - started atorvastatin 20 mg in Feb 2020; TG were 1,466 at onset of DM diagnosis, now  at last check 3/18/2020.     Lab Results   Component Value Date/Time    Cholesterol, total 153 03/18/2020 07:42 AM    Cholesterol, total 454 (H) 01/23/2020 08:13 AM    HDL Cholesterol 33 (L) 03/18/2020 07:42 AM    HDL Cholesterol 28 (L) 01/23/2020 08:13 AM    LDL, calculated 60 03/18/2020 07:42 AM    LDL, calculated Comment 01/23/2020 08:13 AM    Triglyceride 300 (H) 03/18/2020 07:42 AM    Triglyceride 1,466 ([facility]) 01/23/2020 08:13 AM          Vit D Deficiency: Vit D 14.1 on 1/24/2020, started ergocalciferol 50K weekly in Feb 2020    Peripheral Neuropathy: started on Cymbalta 30 in March 2020; she reports the burning in her feet resolved within 3 days of starting it, she is happy with current dose    Lab Results Component Value Date/Time    WBC 6.1 01/23/2020 08:13 AM    HGB 15.5 01/23/2020 08:13 AM    HCT 46.7 (H) 01/23/2020 08:13 AM    PLATELET 084 90/39/4848 08:13 AM    MCV 88 01/23/2020 08:13 AM     Lab Results   Component Value Date/Time    Sodium 134 01/23/2020 08:13 AM    Potassium 4.7 01/23/2020 08:13 AM    Chloride 94 (L) 01/23/2020 08:13 AM    CO2 20 01/23/2020 08:13 AM    Glucose 306 (H) 01/23/2020 08:13 AM    BUN 15 01/23/2020 08:13 AM    Creatinine 0.51 (L) 01/23/2020 08:13 AM    BUN/Creatinine ratio 29 (H) 01/23/2020 08:13 AM    GFR est  01/23/2020 08:13 AM    GFR est non- 01/23/2020 08:13 AM    Calcium 9.5 01/23/2020 08:13 AM    Bilirubin, total 0.7 01/23/2020 08:13 AM    AST (SGOT) 26 01/23/2020 08:13 AM    Alk. phosphatase 170 (H) 01/23/2020 08:13 AM    Protein, total 7.2 01/23/2020 08:13 AM    Albumin 3.5 (L) 01/23/2020 08:13 AM    A-G Ratio 0.9 (L) 01/23/2020 08:13 AM    ALT (SGPT) 39 (H) 01/23/2020 08:13 AM             Prior to Admission medications    Medication Sig Start Date End Date Taking? Authorizing Provider   metFORMIN (GLUCOPHAGE) 500 mg tablet Take 1 Tab by mouth two (2) times daily (with meals). 5/4/20  Yes Vicente Levin MD   liraglutide (VICTOZA) 0.6 mg/0.1 mL (18 mg/3 mL) pnij 1.2 mg by SubCUTAneous route daily. 3/25/20  Yes Gloria Salas NP   atorvastatin (LIPITOR) 20 mg tablet Take 1 Tab by mouth daily. 3/23/20  Yes Gloria Salas NP   lisinopriL (PRINIVIL, ZESTRIL) 10 mg tablet Take 1 Tab by mouth daily. 3/23/20  Yes Gloria Salas NP   cyanocobalamin (VITAMIN B12) 1,000 mcg/mL injection 1 mL by IntraMUSCular route every thirty (30) days. 3/23/20  Yes Gloria Salas NP   Syringe with Needle, Safety (BD Integra Syringe) 3 mL 25 gauge x 5/8\" syrg Use as directed monthly 3/23/20  Yes Gloria Salas NP   ergocalciferol (ERGOCALCIFEROL) 1,250 mcg (50,000 unit) capsule Take 1 Cap by mouth every seven (7) days.  3/23/20  Yes Gloria Salas NP   pen needle, diabetic (NovoTwist) 32 gauge x 1/5\" ndle 30 UNSPECIFIED by Does Not Apply route daily. 3/16/20  Yes Hero Noel NP   DULoxetine (CYMBALTA) 30 mg capsule Take 1 Cap by mouth daily. 3/16/20  Yes Hero Noel NP   Blood-Glucose Meter monitoring kit For use to check blood sugar once a day in morning before eating. Please dispense brand covered by insurance. 2/4/20  Yes Hero Noel NP   glucose blood VI test strips (ASCENSIA AUTODISC VI, ONE TOUCH ULTRA TEST VI) strip For use with glucometer to check blood sugar once a day in morning before eating. Please dispense brand covered by insurance. 2/4/20  Yes Hero Noel NP   lancets misc For use with glucometer to check blood sugar once a day in morning before eating. Please dispense brand covered by insurance. 2/4/20  Yes Hero Noel NP   multivitamin (ONE A DAY) tablet Take 1 Tab by mouth daily. Yes Provider, Historical   ibuprofen (MOTRIN) 200 mg tablet Take 200 mg by mouth every eight (8) hours as needed for Pain.     Provider, Historical     Allergies   Allergen Reactions    Penicillins Atopic Dermatitis    Tetanus And Diphtheria Toxoids Unknown (comments)     In childhood       Patient Active Problem List    Diagnosis Date Noted    Microalbuminuria 03/19/2020    Pedal edema 03/16/2020    Drug-induced constipation 03/16/2020    Controlled type 2 diabetes mellitus without complication, without long-term current use of insulin ([facility] [address] 02/04/2020    Hypercholesterolemia with hypertriglyceridemia 02/04/2020    Vitamin D deficiency 02/04/2020    Vitamin B12 deficiency 02/04/2020    Elevated hemoglobin ([address] 02/04/2020    Essential hypertension 02/04/2020    Obesity, morbid ([address] 01/21/2020    Numbness and tingling 01/21/2020    Carpal tunnel syndrome of right wrist      Current Outpatient Medications   Medication Sig Dispense Refill    metFORMIN (GLUCOPHAGE) 1,000 mg tablet Take 1 Tab by mouth two (2) times daily (with meals). (THIS IS A NEW AND HIGHER DOSE. TO REPLACE  MG TAB) 180 Tab 1    ergocalciferol (ERGOCALCIFEROL) 1,250 mcg (50,000 unit) capsule Take 1 Cap by mouth every seven (7) days. 13 Cap 1    DULoxetine (CYMBALTA) 30 mg capsule Take 1 Cap by mouth daily. 90 Cap 1    liraglutide (VICTOZA) 0.6 mg/0.1 mL (18 mg/3 mL) pnij 1.2 mg by SubCUTAneous route daily. 5 Adjustable Dose Pre-filled Pen Syringe 2    atorvastatin (LIPITOR) 20 mg tablet Take 1 Tab by mouth daily. 30 Tab 1    lisinopriL (PRINIVIL, ZESTRIL) 10 mg tablet Take 1 Tab by mouth daily. 90 Tab 1    cyanocobalamin (VITAMIN B12) 1,000 mcg/mL injection 1 mL by IntraMUSCular route every thirty (30) days. 3 Vial 1    Syringe with Needle, Safety (BD Integra Syringe) 3 mL 25 gauge x 5/8\" syrg Use as directed monthly 3 Pen Needle 1    pen needle, diabetic (NovoTwist) 32 gauge x 1/5\" ndle 30 UNSPECIFIED by Does Not Apply route daily. 30 Pen Needle 2    Blood-Glucose Meter monitoring kit For use to check blood sugar once a day in morning before eating. Please dispense brand covered by insurance. 1 Kit 0    glucose blood VI test strips (ASCENSIA AUTODISC VI, ONE TOUCH ULTRA TEST VI) strip For use with glucometer to check blood sugar once a day in morning before eating. Please dispense brand covered by insurance. 100 Strip 2    lancets misc For use with glucometer to check blood sugar once a day in morning before eating. Please dispense brand covered by insurance. 1 Each 11    multivitamin (ONE A DAY) tablet Take 1 Tab by mouth daily.  ibuprofen (MOTRIN) 200 mg tablet Take 200 mg by mouth every eight (8) hours as needed for Pain.        Allergies   Allergen Reactions    Penicillins Atopic Dermatitis    Tetanus And Diphtheria Toxoids Unknown (comments)     In childhood     Past Medical History:   Diagnosis Date    Carpal tunnel syndrome of right wrist     Cellulitis and abscess of left lower extremity     Diabetes ([facility] [address]     Hypercholesterolemia     Hypertension      History reviewed. No pertinent surgical history. Family History   Problem Relation Age of Onset    Other Mother         osteopenia    Arthritis-osteo Mother     Hypertension Mother     Diabetes Mother     Hypertension Father     Diabetes Father     Heart Disease Father     Hypertension Sister      Social History     Tobacco Use    Smoking status: Never Smoker    Smokeless tobacco: Never Used   Substance Use Topics    Alcohol use: Not Currently       ROS    Per HPI    Objective: There were no vitals taken for this visit. General: alert, cooperative, no distress   Mental  status: normal mood, behavior, speech, dress, motor activity, and thought processes, able to follow commands   HENT: NCAT   Neck: no visualized mass   Resp: no respiratory distress   Neuro: no gross deficits   Skin: no discoloration or lesions of concern on visible areas   Psychiatric: normal affect, consistent with stated mood, no evidence of hallucinations     Additional exam findings: none      We discussed the expected course, resolution and complications of the diagnosis(es) in detail. Medication risks, benefits, costs, interactions, and alternatives were discussed as indicated. I advised her to contact the office if her condition worsens, changes or fails to improve as anticipated. She expressed understanding with the diagnosis(es) and plan. Katelyn Rangel is a 62 y.o. female who was evaluated by a video visit encounter for concerns as above. Patient identification was verified prior to start of the visit. A caregiver was present when appropriate. Due to this being a TeleHealth encounter (During XMEJY-45 public health emergency), evaluation of the following organ systems was limited: Vitals/Constitutional/EENT/Resp/CV/GI//MS/Neuro/Skin/Heme-Lymph-Imm.   Pursuant to the emergency declaration under the [facility] waiver authority and the Coronavirus Preparedness and Response Supplemental Appropriations Act, this Virtual  Visit was conducted, with patient's (and/or legal guardian's) consent, to reduce the patient's risk of exposure to COVID-19 and provide necessary medical care. Services were provided through a video synchronous discussion virtually to substitute for in-person clinic visit. Patient and provider were located at their individual homes.       Sil Solorzano PA-C

## 2020-05-04 NOTE — PROGRESS NOTES
Chief Complaint   Patient presents with    Follow-up     [facility] DM    Establish Care     1. Have you been to the ER, urgent care clinic since your last visit? Hospitalized since your last visit? No    2. Have you seen or consulted any other health care providers outside of the [address] [place] [address] since your last visit? Include any pap smears or colon screening.  No     Pt visit is to Establish care and follow up with [facility] DM

## 2020-06-03 DIAGNOSIS — E53.8 VITAMIN B12 DEFICIENCY: Primary | ICD-10-CM

## 2020-06-16 ENCOUNTER — TELEPHONE (OUTPATIENT)
Dept: FAMILY MEDICINE CLINIC | Age: 55
End: 2020-06-16

## 2020-06-16 DIAGNOSIS — K59.03 DRUG-INDUCED CONSTIPATION: ICD-10-CM

## 2020-06-16 DIAGNOSIS — E11.65 TYPE 2 DIABETES MELLITUS WITH HYPERGLYCEMIA, WITHOUT LONG-TERM CURRENT USE OF INSULIN (HCC): Primary | ICD-10-CM

## 2020-06-16 NOTE — TELEPHONE ENCOUNTER
Called pharmacy to see if a different brand of syringes would be covered. Pharmacist states that her insurance has never covered her syringes and that she has been using coupons for them. Pharmacist also states she has sent over a request for Victoza pen tips and has not heard back from the office. Advised that I will relay to PCP.

## 2020-06-17 RX ORDER — PEN NEEDLE, DIABETIC 30 GX 1/3"
30 NEEDLE, DISPOSABLE MISCELLANEOUS DAILY
Qty: 30 PEN NEEDLE | Refills: 2 | Status: SHIPPED | OUTPATIENT
Start: 2020-06-17 | End: 2021-02-16 | Stop reason: SDUPTHER

## 2020-06-18 NOTE — TELEPHONE ENCOUNTER
I apologize, but I never saw an earlier request. I just refilled her NovoTwist pen needle electronically.

## 2020-07-21 ENCOUNTER — TELEPHONE (OUTPATIENT)
Dept: FAMILY MEDICINE CLINIC | Age: 55
End: 2020-07-21

## 2020-07-21 NOTE — TELEPHONE ENCOUNTER
Returned phone call to pt regarding Labs. Wanted to know where she would like Lab slip faxed to. Left a generic voice message for a call back to the office.

## 2020-07-30 LAB
ALBUMIN SERPL-MCNC: 4 G/DL (ref 3.8–4.9)
ALBUMIN/GLOB SERPL: 1.5 {RATIO} (ref 1.2–2.2)
ALP SERPL-CCNC: 180 IU/L (ref 39–117)
ALT SERPL-CCNC: 54 IU/L (ref 0–32)
AST SERPL-CCNC: 24 IU/L (ref 0–40)
BILIRUB SERPL-MCNC: 0.6 MG/DL (ref 0–1.2)
BUN SERPL-MCNC: 24 MG/DL (ref 6–24)
BUN/CREAT SERPL: 31 (ref 9–23)
CALCIUM SERPL-MCNC: 9.5 MG/DL (ref 8.7–10.2)
CHLORIDE SERPL-SCNC: 102 MMOL/L (ref 96–106)
CO2 SERPL-SCNC: 23 MMOL/L (ref 20–29)
CREAT SERPL-MCNC: 0.77 MG/DL (ref 0.57–1)
EST. AVERAGE GLUCOSE BLD GHB EST-MCNC: 117 MG/DL
GLOBULIN SER CALC-MCNC: 2.6 G/DL (ref 1.5–4.5)
GLUCOSE SERPL-MCNC: 141 MG/DL (ref 65–99)
HBA1C MFR BLD: 5.7 % (ref 4.8–5.6)
POTASSIUM SERPL-SCNC: 4.8 MMOL/L (ref 3.5–5.2)
PROT SERPL-MCNC: 6.6 G/DL (ref 6–8.5)
SODIUM SERPL-SCNC: 139 MMOL/L (ref 134–144)

## 2020-08-10 DIAGNOSIS — K82.4 GALLBLADDER POLYP: ICD-10-CM

## 2020-08-10 DIAGNOSIS — R74.8 ELEVATED LIVER ENZYMES: Primary | ICD-10-CM

## 2020-08-10 NOTE — PROGRESS NOTES
Exacter message sent. Your A1C is 5.7%! This is great! Stay on the same medicines, and continue to follow diabetic diet. The liver enzymes are still elevated. Avoid Tylenol and alcohol. I have ordered an ultrasound of the liver and will recheck some labs with the next blood work. You should get a call from a Kota Calderon to schedule the ultrasound. Call our office to schedule an office visit for September.

## 2020-08-24 ENCOUNTER — HOSPITAL ENCOUNTER (OUTPATIENT)
Dept: ULTRASOUND IMAGING | Age: 55
Discharge: HOME | End: 2020-08-24
Payer: COMMERCIAL

## 2020-08-24 DIAGNOSIS — R74.8 ELEVATED LIVER ENZYMES: ICD-10-CM

## 2020-08-24 PROCEDURE — 76705 ECHO EXAM OF ABDOMEN: CPT

## 2020-09-01 NOTE — PROGRESS NOTES
Please call pt - the US shows the liver looks good. There are 2 polyps in the gallbladder. I have referred her to GI to evaluate and monitor the gallbladder polyps. Sobeida give her the referral information so she can schedule appointment. ([address] is listed as active, but she doesn't appear to use it).

## 2020-09-11 ENCOUNTER — TELEPHONE (OUTPATIENT)
Dept: FAMILY MEDICINE CLINIC | Age: 55
End: 2020-09-11

## 2020-10-12 DIAGNOSIS — I10 ESSENTIAL HYPERTENSION: ICD-10-CM

## 2020-10-12 RX ORDER — LISINOPRIL 10 MG/1
10 TABLET ORAL DAILY
Qty: 90 TAB | Refills: 0 | Status: SHIPPED | OUTPATIENT
Start: 2020-10-12 | End: 2021-01-14

## 2020-10-12 NOTE — TELEPHONE ENCOUNTER
PCP: Ifeoma Ortiz PA-C    Last appt: Visit date not found  No future appointments. Requested Prescriptions     Pending Prescriptions Disp Refills    lisinopriL (PRINIVIL, ZESTRIL) 10 mg tablet 90 Tab 1     Sig: Take 1 Tab by mouth daily. [facility]    Patient has 0 days' supply of medication available.     Prior labs and Blood pressures:  BP Readings from Last 3 Encounters:   03/16/20 131/64   02/04/20 150/75   01/21/20 (!) 163/93     Lab Results   Component Value Date/Time    Sodium 139 07/29/2020 07:57 AM    Potassium 4.8 07/29/2020 07:57 AM    Chloride 102 07/29/2020 07:57 AM    CO2 23 07/29/2020 07:57 AM    Glucose 141 (H) 07/29/2020 07:57 AM    BUN 24 07/29/2020 07:57 AM    Creatinine 0.77 07/29/2020 07:57 AM    BUN/Creatinine ratio 31 (H) 07/29/2020 07:57 AM    GFR est  07/29/2020 07:57 AM    GFR est non-AA 87 07/29/2020 07:57 AM    Calcium 9.5 07/29/2020 07:57 AM     Lab Results   Component Value Date/Time    Hemoglobin A1c 5.7 (H) 07/29/2020 07:57 AM     Lab Results   Component Value Date/Time    Cholesterol, total 153 03/18/2020 07:42 AM    HDL Cholesterol 33 (L) 03/18/2020 07:42 AM    LDL, calculated 60 03/18/2020 07:42 AM    VLDL, calculated 60 (H) 03/18/2020 07:42 AM    Triglyceride 300 (H) 03/18/2020 07:42 AM     Lab Results   Component Value Date/Time    VITAMIN D, 25-HYDROXY 14.1 (L) 01/23/2020 08:13 AM       Lab Results   Component Value Date/Time    TSH 1.860 01/23/2020 08:13 AM

## 2020-11-09 DIAGNOSIS — E11.65 TYPE 2 DIABETES MELLITUS WITH HYPERGLYCEMIA, WITHOUT LONG-TERM CURRENT USE OF INSULIN (HCC): ICD-10-CM

## 2020-11-12 RX ORDER — METFORMIN HYDROCHLORIDE 1000 MG/1
TABLET ORAL
Qty: 180 TAB | Refills: 0 | Status: SHIPPED | OUTPATIENT
Start: 2020-11-12 | End: 2021-02-16 | Stop reason: SDUPTHER

## 2020-12-09 DIAGNOSIS — E55.9 VITAMIN D DEFICIENCY: ICD-10-CM

## 2020-12-09 DIAGNOSIS — E78.2 HYPERCHOLESTEROLEMIA WITH HYPERTRIGLYCERIDEMIA: ICD-10-CM

## 2020-12-09 DIAGNOSIS — E53.8 VITAMIN B12 DEFICIENCY: ICD-10-CM

## 2020-12-14 RX ORDER — ATORVASTATIN CALCIUM 20 MG/1
20 TABLET, FILM COATED ORAL DAILY
Qty: 90 TAB | Refills: 0 | Status: SHIPPED | OUTPATIENT
Start: 2020-12-14 | End: 2021-02-16 | Stop reason: SDUPTHER

## 2020-12-14 RX ORDER — ERGOCALCIFEROL 1.25 MG/1
50000 CAPSULE ORAL
Qty: 13 CAP | Refills: 1 | OUTPATIENT
Start: 2020-12-14

## 2020-12-14 RX ORDER — CYANOCOBALAMIN 1000 UG/ML
1000 INJECTION, SOLUTION INTRAMUSCULAR; SUBCUTANEOUS
Qty: 3 VIAL | Refills: 1
Start: 2020-12-14 | End: 2021-02-16 | Stop reason: SDUPTHER

## 2021-01-12 DIAGNOSIS — I10 ESSENTIAL HYPERTENSION: ICD-10-CM

## 2021-01-14 RX ORDER — LISINOPRIL 10 MG/1
TABLET ORAL
Qty: 90 TAB | Refills: 0 | Status: SHIPPED | OUTPATIENT
Start: 2021-01-14 | End: 2021-02-16 | Stop reason: SDUPTHER

## 2021-04-21 DIAGNOSIS — I10 ESSENTIAL HYPERTENSION: ICD-10-CM

## 2021-04-21 NOTE — TELEPHONE ENCOUNTER
PCP: Christal Cota PA-C    Last appt: Visit date not found  No future appointments. Requested Prescriptions     Pending Prescriptions Disp Refills    lisinopriL (PRINIVIL, ZESTRIL) 10 mg tablet 90 Tab 0     Sig: TAKE ONE TABLET BY MOUTH ONE TIME DAILY, DUE FOR OFFICE VISIT FOR BP CHECK     No visits with results within 3 Month(s) from this visit. Latest known visit with results is:   Orders Only on 05/04/2020   Component Date Value Ref Range Status    Glucose 07/29/2020 141* 65 - 99 mg/dL Final    BUN 07/29/2020 24  6 - 24 mg/dL Final    Creatinine 07/29/2020 0.77  0.57 - 1.00 mg/dL Final    GFR est non-AA 07/29/2020 87  >59 mL/min/1.73 Final    GFR est AA 07/29/2020 100  >59 mL/min/1.73 Final    BUN/Creatinine ratio 07/29/2020 31* 9 - 23 Final    Sodium 07/29/2020 139  134 - 144 mmol/L Final    Potassium 07/29/2020 4.8  3.5 - 5.2 mmol/L Final    Chloride 07/29/2020 102  96 - 106 mmol/L Final    CO2 07/29/2020 23  20 - 29 mmol/L Final    Calcium 07/29/2020 9.5  8.7 - 10.2 mg/dL Final    Protein, total 07/29/2020 6.6  6.0 - 8.5 g/dL Final    Albumin 07/29/2020 4.0  3.8 - 4.9 g/dL Final    GLOBULIN, TOTAL 07/29/2020 2.6  1.5 - 4.5 g/dL Final    A-G Ratio 07/29/2020 1.5  1.2 - 2.2 Final    Bilirubin, total 07/29/2020 0.6  0.0 - 1.2 mg/dL Final    Alk.  phosphatase 07/29/2020 180* 39 - 117 IU/L Final    AST (SGOT) 07/29/2020 24  0 - 40 IU/L Final    ALT (SGPT) 07/29/2020 54* 0 - 32 IU/L Final    Hemoglobin A1c 07/29/2020 5.7* 4.8 - 5.6 % Final    Comment:          Prediabetes: 5.7 - 6.4           Diabetes: >6.4           Glycemic control for adults with diabetes: <7.0      Estimated average glucose 07/29/2020 117  mg/dL Final

## 2021-04-26 RX ORDER — LISINOPRIL 10 MG/1
TABLET ORAL
Qty: 90 TAB | Refills: 0 | Status: SHIPPED | OUTPATIENT
Start: 2021-04-26 | End: 2021-07-08 | Stop reason: SDUPTHER

## 2021-04-26 NOTE — TELEPHONE ENCOUNTER
Please notify pt - we are closing 5/1/21. I was reviewing her chart and just saw a [employer] request for an appt that doesn't appear was answered. Please apologize and give her my new number to call to schedule ian at my new office for May.

## 2021-06-19 NOTE — TELEPHONE ENCOUNTER
Patient states she had lab orders printed but she lost the orders.  Would like to know if she can come  a new copy or can they be faxed to her    [phone] Other

## 2021-06-30 DIAGNOSIS — E78.2 HYPERCHOLESTEROLEMIA WITH HYPERTRIGLYCERIDEMIA: ICD-10-CM

## 2021-06-30 RX ORDER — ATORVASTATIN CALCIUM 20 MG/1
20 TABLET, FILM COATED ORAL DAILY
Qty: 90 TABLET | Refills: 0 | Status: SHIPPED | OUTPATIENT
Start: 2021-06-30 | End: 2021-07-08 | Stop reason: SDUPTHER

## 2021-06-30 NOTE — TELEPHONE ENCOUNTER
Last Refill: 02/17/21  Last Visit: Visit date not found   Next Visit: 7/8/2021    Requested Prescriptions     Pending Prescriptions Disp Refills    atorvastatin (LIPITOR) 20 mg tablet 90 Tablet 0     Sig: Take 1 Tablet by mouth daily.

## 2021-07-08 ENCOUNTER — OFFICE VISIT (OUTPATIENT)
Dept: INTERNAL MEDICINE CLINIC | Age: 56
End: 2021-07-08
Payer: COMMERCIAL

## 2021-07-08 VITALS
HEART RATE: 95 BPM | RESPIRATION RATE: 18 BRPM | SYSTOLIC BLOOD PRESSURE: 136 MMHG | OXYGEN SATURATION: 97 % | WEIGHT: 270.5 LBS | TEMPERATURE: 97.2 F | DIASTOLIC BLOOD PRESSURE: 84 MMHG | HEIGHT: 67 IN | BODY MASS INDEX: 42.46 KG/M2

## 2021-07-08 DIAGNOSIS — K59.03 DRUG-INDUCED CONSTIPATION: ICD-10-CM

## 2021-07-08 DIAGNOSIS — E11.65 UNCONTROLLED TYPE 2 DIABETES MELLITUS WITH HYPERGLYCEMIA (HCC): Primary | ICD-10-CM

## 2021-07-08 DIAGNOSIS — R60.0 LOCALIZED EDEMA: ICD-10-CM

## 2021-07-08 DIAGNOSIS — E53.8 VITAMIN B12 DEFICIENCY: ICD-10-CM

## 2021-07-08 DIAGNOSIS — K82.4 GALLBLADDER POLYP: ICD-10-CM

## 2021-07-08 DIAGNOSIS — R74.8 ELEVATED LIVER ENZYMES: ICD-10-CM

## 2021-07-08 DIAGNOSIS — E55.9 VITAMIN D DEFICIENCY: ICD-10-CM

## 2021-07-08 DIAGNOSIS — R20.2 NUMBNESS AND TINGLING: ICD-10-CM

## 2021-07-08 DIAGNOSIS — I10 ESSENTIAL HYPERTENSION: ICD-10-CM

## 2021-07-08 DIAGNOSIS — R20.0 NUMBNESS AND TINGLING: ICD-10-CM

## 2021-07-08 DIAGNOSIS — E66.01 OBESITY, MORBID (HCC): ICD-10-CM

## 2021-07-08 DIAGNOSIS — E78.2 HYPERCHOLESTEROLEMIA WITH HYPERTRIGLYCERIDEMIA: ICD-10-CM

## 2021-07-08 DIAGNOSIS — G62.9 NEUROPATHY: ICD-10-CM

## 2021-07-08 DIAGNOSIS — R80.9 MICROALBUMINURIA: ICD-10-CM

## 2021-07-08 DIAGNOSIS — E11.65 TYPE 2 DIABETES MELLITUS WITH HYPERGLYCEMIA, WITHOUT LONG-TERM CURRENT USE OF INSULIN (HCC): ICD-10-CM

## 2021-07-08 LAB — HBA1C MFR BLD HPLC: 9.1 % (ref 4.5–5.7)

## 2021-07-08 PROCEDURE — 83036 HEMOGLOBIN GLYCOSYLATED A1C: CPT | Performed by: PHYSICIAN ASSISTANT

## 2021-07-08 PROCEDURE — 99214 OFFICE O/P EST MOD 30 MIN: CPT | Performed by: PHYSICIAN ASSISTANT

## 2021-07-08 RX ORDER — PEN NEEDLE, DIABETIC 30 GX 1/3"
30 NEEDLE, DISPOSABLE MISCELLANEOUS DAILY
Qty: 100 PEN NEEDLE | Refills: 1 | Status: SHIPPED | OUTPATIENT
Start: 2021-07-08 | End: 2022-03-08 | Stop reason: SDUPTHER

## 2021-07-08 RX ORDER — METFORMIN HYDROCHLORIDE 1000 MG/1
TABLET ORAL
Qty: 180 TABLET | Refills: 1 | Status: SHIPPED | OUTPATIENT
Start: 2021-07-08 | End: 2021-11-15

## 2021-07-08 RX ORDER — LISINOPRIL 10 MG/1
TABLET ORAL
Qty: 90 TABLET | Refills: 1 | Status: SHIPPED | OUTPATIENT
Start: 2021-07-08 | End: 2022-01-24 | Stop reason: SDUPTHER

## 2021-07-08 RX ORDER — DULOXETIN HYDROCHLORIDE 30 MG/1
30 CAPSULE, DELAYED RELEASE ORAL DAILY
Qty: 90 CAPSULE | Refills: 1 | Status: SHIPPED | OUTPATIENT
Start: 2021-07-08 | End: 2022-01-24 | Stop reason: SDUPTHER

## 2021-07-08 RX ORDER — ATORVASTATIN CALCIUM 20 MG/1
20 TABLET, FILM COATED ORAL DAILY
Qty: 90 TABLET | Refills: 1 | Status: SHIPPED | OUTPATIENT
Start: 2021-07-08 | End: 2022-01-24 | Stop reason: SDUPTHER

## 2021-07-08 NOTE — PROGRESS NOTES
Blanca Moran is a 60 y.o. female     Chief Complaint   Patient presents with    Diabetes     follow up    Hypertension     follow up       Visit Vitals  /84 (BP 1 Location: Right arm, BP Patient Position: Sitting, BP Cuff Size: Adult)   Pulse 95   Temp 97.2 °F (36.2 °C) (Oral)   Resp 18   Ht 5' 7\" (1.702 m)   Wt 270 lb 8 oz (122.7 kg)   SpO2 97%   BMI 42.37 kg/m²       Health Maintenance Due   Topic Date Due    Pneumococcal 0-64 years (1 of 2 - PPSV23) Never done    Foot Exam Q1  Never done    Eye Exam Retinal or Dilated  Never done    DTaP/Tdap/Td series (1 - Tdap) Never done    PAP AKA CERVICAL CYTOLOGY  Never done    Colorectal Cancer Screening Combo  Never done    Shingrix Vaccine Age 50> (1 of 2) Never done    Breast Cancer Screen Mammogram  Never done    MICROALBUMIN Q1  03/18/2021    Lipid Screen  03/18/2021       1. Have you been to the ER, urgent care clinic since your last visit? Hospitalized since your last visit? No    2. Have you seen or consulted any other health care providers outside of the [address] [place] [address] since your last visit? Include any pap smears or colon screening. No    Patient stated she had her shingrix vaccine and her Pneumonia vaccine done at [facility] next to [facility]. Records will be requested. No recent Pap or mammogram.    No recent colonoscopy.     No recent eye exam.

## 2021-07-08 NOTE — PATIENT INSTRUCTIONS
Diabetes Foot Health: Care Instructions  Your Care Instructions     When you have diabetes, your feet need extra care and attention. Diabetes can damage the nerve endings and blood vessels in your feet, making you less likely to notice when your feet are injured. Diabetes also limits your body's ability to fight infection and get blood to areas that need it. If you get a minor foot injury, it could become an ulcer or a serious infection. With good foot care, you can prevent most of these problems. Caring for your feet can be quick and easy. Most of the care can be done when you are bathing or getting ready for bed. Follow-up care is a key part of your treatment and safety. Be sure to make and go to all appointments, and call your doctor if you are having problems. It's also a good idea to know your test results and keep a list of the medicines you take. How can you care for yourself at home? · Keep your blood sugar close to normal by watching what and how much you eat, monitoring blood sugar, taking medicines if prescribed, and getting regular exercise. · Do not smoke. Smoking affects blood flow and can make foot problems worse. If you need help quitting, talk to your doctor about stop-smoking programs and medicines. These can increase your chances of quitting for good. · Eat a diet that is low in fats. High fat intake can cause fat to build up in your blood vessels and decrease blood flow. · Inspect your feet daily for blisters, cuts, cracks, or sores. If you cannot see well, use a mirror or have someone help you. · Take care of your feet:  ? Wash your feet every day. Use warm (not hot) water. Check the water temperature with your wrists or other part of your body, not your feet. ? Dry your feet well. Pat them dry. Do not rub the skin on your feet too hard. Dry well between your toes. If the skin on your feet stays moist, bacteria or a fungus can grow, which can lead to infection. ?  Keep your skin soft. Use moisturizing skin cream to keep the skin on your feet soft and prevent calluses and cracks. But do not put the cream between your toes, and stop using any cream that causes a rash. ? Clean underneath your toenails carefully. Do not use a sharp object to clean underneath your toenails. Use the blunt end of a nail file or other rounded tool. ? Trim and file your toenails straight across to prevent ingrown toenails. Use a nail clipper, not scissors. Use an emery board to smooth the edges. · Change socks daily. Socks without seams are best, because seams often rub the feet. You can find socks for people with diabetes from specialty catalogs. · Look inside your shoes every day for things like gravel or torn linings, which could cause blisters or sores. · Buy shoes that fit well:  ? Look for shoes that have plenty of space around the toes. This helps prevent bunions and blisters. ? Try on shoes while wearing the kind of socks you will usually wear with the shoes. ? Avoid plastic shoes. They may rub your feet and cause blisters. Good shoes should be made of materials that are flexible and breathable, such as leather or cloth. ? Break in new shoes slowly by wearing them for no more than an hour a day for several days. Take extra time to check your feet for red areas, blisters, or other problems after you wear new shoes. · Do not go barefoot. Do not wear sandals, and do not wear shoes with very thin soles. Thin soles are easy to puncture. They also do not protect your feet from hot pavement or cold weather. · Have your doctor check your feet during each visit. If you have a foot problem, see your doctor. Do not try to treat an early foot problem at home. Home remedies or treatments that you can buy without a prescription (such as corn removers) can be harmful. · Always get early treatment for foot problems. A minor irritation can lead to a major problem if not properly cared for early.   When should you call for help? Call your doctor now or seek immediate medical care if:    · You have a foot sore, an ulcer or break in the skin that is not healing after 4 days, bleeding corns or calluses, or an ingrown toenail.     · You have blue or black areas, which can mean bruising or blood flow problems.     · You have peeling skin or tiny blisters between your toes or cracking or oozing of the skin.     · You have a fever for more than 24 hours and a foot sore.     · You have new numbness or tingling in your feet that does not go away after you move your feet or change positions.     · You have unexplained or unusual swelling of the foot or ankle. Watch closely for changes in your health, and be sure to contact your doctor if:    · You cannot do proper foot care. Where can you learn more? Go to http://www.gray.com/  Enter A739 in the search box to learn more about \"Diabetes Foot Health: Care Instructions. \"  Current as of: August 31, 2020               Content Version: 12.8  © 2006-2021 [employer] Care instructions adapted under license by [employer] (which disclaims liability or warranty for this information). If you have questions about a medical condition or this instruction, always ask your healthcare professional. [address] any warranty or liability for your use of this information. Low Sodium Diet (2,000 Milligram): Care Instructions  Overview     Limiting sodium can be an important part of managing some health problems. The most common source of sodium is salt. People get most of the salt in their diet from canned, prepared, and packaged foods. Fast food and restaurant meals also are very high in sodium. Your doctor will probably limit your sodium to less than 2,000 milligrams (mg) a day. This limit counts all the sodium in prepared and packaged foods and any salt you add to your food.   Follow-up care is a key part of your treatment and safety. Be sure to make and go to all appointments, and call your doctor if you are having problems. It's also a good idea to know your test results and keep a list of the medicines you take. How can you care for yourself at home? Read food labels  · Read labels on cans and food packages. The labels tell you how much sodium is in each serving. Make sure that you look at the serving size. If you eat more than the serving size, you have eaten more sodium. · Food labels also tell you the Percent Daily Value for sodium. Choose products with low Percent Daily Values for sodium. · Be aware that sodium can come in forms other than salt, including monosodium glutamate (MSG), sodium citrate, and sodium bicarbonate (baking soda). MSG is often added to Asian food. When you eat out, you can sometimes ask for food without MSG or added salt. Buy low-sodium foods  · Buy foods that are labeled \"unsalted\" (no salt added), \"sodium-free\" (less than 5 mg of sodium per serving), or \"low-sodium\" (140 mg or less of sodium per serving). Foods labeled \"reduced-sodium\" and \"light sodium\" may still have too much sodium. Be sure to read the label to see how much sodium you are getting. · Buy fresh vegetables, or frozen vegetables without added sauces. Buy low-sodium versions of canned vegetables, soups, and other canned goods. Prepare low-sodium meals  · Cut back on the amount of salt you use in cooking. This will help you adjust to the taste. Do not add salt after cooking. One teaspoon of salt has about 2,300 mg of sodium. · Take the salt shaker off the table. · Flavor your food with garlic, lemon juice, onion, vinegar, herbs, and spices. Do not use soy sauce, lite soy sauce, steak sauce, onion salt, garlic salt, celery salt, or ketchup on your food. · Use low-sodium salad dressings, sauces, and ketchup. Or make your own salad dressings and sauces without adding salt.   · Use less salt (or none) when recipes call for it. You can often use half the salt a recipe calls for without losing flavor. Other foods such as rice, pasta, and grains do not need added salt. · Rinse canned vegetables, and cook them in fresh water. This removes somebut not allof the salt. · Avoid water that is naturally high in sodium or that has been treated with water softeners, which add sodium. If you buy bottled water, read the label and choose a sodium-free brand. Avoid high-sodium foods  · Avoid eating:  ? Smoked, cured, salted, and canned meat, fish, and poultry. ? Ham, thornton, hot dogs, and luncheon meats. ? Regular, hard, and processed cheese and regular peanut butter. ? Crackers with salted tops, and other salted snack foods such as pretzels, chips, and salted popcorn. ? Frozen prepared meals, unless labeled low-sodium. ? Canned and dried soups, broths, and bouillon, unless labeled sodium-free or low-sodium. ? Canned vegetables, unless labeled sodium-free or low-sodium. ? [place] fries, pizza, tacos, and other fast foods. ? Pickles, olives, ketchup, and other condiments, especially soy sauce, unless labeled sodium-free or low-sodium. Where can you learn more? Go to http://www.gray.com/  Enter V843 in the search box to learn more about \"Low Sodium Diet (2,000 Milligram): Care Instructions. \"  Current as of: December 17, 2020               Content Version: 12.8  © 2006-2021 [employer]. Care instructions adapted under license by [employer] (which disclaims liability or warranty for this information). If you have questions about a medical condition or this instruction, always ask your healthcare professional. [place] [address] any warranty or liability for your use of this information. Learning About Low-Sodium Foods  What foods are low in sodium? The foods you eat contain nutrients, such as vitamins and minerals. Sodium is a nutrient.  Your body needs the right amount to stay healthy and work as it should. You can use the list below to help you make choices about which foods to eat. Fruits  · Fresh, frozen, canned, or dried fruit  Vegetables  · Fresh or frozen vegetables, with no added salt  · Canned vegetables, low-sodium or with no added salt  Grains  · Bagels without salted tops  · Cereal with no added salt  · Corn tortillas  · Crackers with no added salt  · Oatmeal, cooked without salt  · Popcorn with no salt  · Pasta and noodles, cooked without salt  · Rice, cooked without salt  · Unsalted pretzels  Dairy and dairy alternatives  · Butter, unsalted  · Cream cheese  · Ice cream  · Milk  · Soy milk  Meats and other protein foods  · Beans and peas, canned with no salt  · Eggs  · Fresh fish (not smoked)  · Fresh meats (not smoked or cured)  · Nuts and nut butter, prepared without salt  · Poultry, not packaged with sodium solution  · Tofu, unseasoned  · Tuna, canned without salt  Seasonings  · Garlic  · Herbs and spices  · Lemon juice  · Mustard  · Olive oil  · Salt-free seasoning mixes  · Vinegar  Work with your doctor to find out how much of this nutrient you need. Depending on your health, you may need more or less of it in your diet. Where can you learn more? Go to http://www.gray.com/  Enter S460 in the search box to learn more about \"Learning About Low-Sodium Foods. \"  Current as of: December 17, 2020               Content Version: 12.8  © 2006-2021 [employer]. Care instructions adapted under license by [employer] (which disclaims liability or warranty for this information). If you have questions about a medical condition or this instruction, always ask your healthcare professional. [place] [address] any warranty or liability for your use of this information.

## 2021-07-08 NOTE — PROGRESS NOTES
HPI:  60 y.o.  presents for follow up appointment. No hospital, ER or specialist visits since last primary care visit except as noted below. Last visit VV 5/04/2020    DM: Diagnosed 1/2020. Checks blood glucose each morning and usually 200s-300s. .  Takes medications as directed. On metformin 1000 mg BID (increased from 500 mg BID at last visit 5/4/2020), and was on Victoza 1.2 mg daily (started 3/16/2020), however she traveled in December 2020 and again in March 2021 and forgot her Victoza, she has been off of Victoza since March 2021. No polyuria/polydipsia. Prior tingling and numbness in feet was well controlled since starting Cymbalta 30 mg, but sxs have increased over the last 4 mos since her sugars have gone up. Unable to perform monofilament exam at last visit since it was virtual. Last A1C 5.7% on 7/29/2020; previously 10.7% on 1/23/2020. Labs on 3/18/2020 showed + microalbuminuria. At diagnosis had very high TG 1,466. On statin and ACE-I. See below.   -she has never had DEP     She was doing Weight Watchers last year, and states her weight was down to 235 lbs, last year in person visit was 250 lbs, and today is back up to 270 lbs    Hypertension - newly diagnosed 1/2020. compliant with medication, on lisinopril 10 mg daily,  home monitoring averages 112-124/76-90, pulse 85. No history of heart disease or stroke. No chest pain, no shortness of breath, no headaches, no lower extremity swelling.      CHL - started atorvastatin 20 mg in Feb 2020; TG were 1,466 at onset of DM diagnosis, now  at last check 3/18/2020.     Vit D Deficiency: Vit D 14.1 on 1/24/2020, started ergocalciferol 50K weekly in Feb 2020; she is now on D3 2000 units daily     Peripheral Neuropathy: started on Cymbalta 30 in March 2020; she reports the burning in her feet resolved within 3 days of starting it, however symptoms increased over the last 4 mos since her sugars have gone up    Vitamin B12 deficiency - last level 286 on 1/23/2020, she is giving her B12 at home by her sister    Elevated liver enzymes last year, had gallbladder polyps on US 2020, did not see GI yet  Denies RUQ pain or sxs with eating    She works from home now, is an . She lives with her sister. Patient Active Problem List    Diagnosis    Microalbuminuria    Pedal edema    Drug-induced constipation    Controlled type 2 diabetes mellitus without complication, without long-term current use of insulin (HCC)    Hypercholesterolemia with hypertriglyceridemia    Vitamin D deficiency    Vitamin B12 deficiency    Elevated hemoglobin (HCC)    Essential hypertension    Obesity, morbid (HCC)    Numbness and tingling    Carpal tunnel syndrome of right wrist         Past Medical History:   Diagnosis Date    Carpal tunnel syndrome of right wrist     Cellulitis and abscess of left lower extremity     Diabetes ([facility] [address]     Hypercholesterolemia     Hypertension        Social History     Tobacco Use    Smoking status: Never Smoker    Smokeless tobacco: Never Used   Vaping Use    Vaping Use: Never used   Substance Use Topics    Alcohol use: Not Currently    Drug use: Never       Outpatient Medications Marked as Taking for the 7/8/21 encounter (Office Visit) with Sil Olivarez PA-C   Medication Sig Dispense Refill    atorvastatin (LIPITOR) 20 mg tablet Take 1 Tablet by mouth daily. 90 Tablet 0    metFORMIN (GLUCOPHAGE) 1,000 mg tablet TAKE ONE TABLET BY MOUTH TWICE A DAY WITH A MEAL. Office visit/labs due before next refill 60 Tablet 0    lisinopriL (PRINIVIL, ZESTRIL) 10 mg tablet TAKE ONE TABLET BY MOUTH ONE TIME DAILY, DUE FOR OFFICE VISIT FOR BP CHECK 90 Tab 0    cyanocobalamin (VITAMIN B12) 1,000 mcg/mL injection 1 mL by IntraMUSCular route every thirty (30) days. 3 Vial 1    DULoxetine (CYMBALTA) 30 mg capsule Take 1 Cap by mouth daily.  90 Cap 1    glucose blood VI test strips (ASCENSIA AUTODISC VI, ONE TOUCH ULTRA TEST VI) strip For use with glucometer to check blood sugar once a day in morning before eating. Please dispense brand covered by insurance. 100 Strip 2    lancets misc For use with glucometer to check blood sugar once a day in morning before eating. Please dispense brand covered by insurance. 1 Each 11    Syringe with Needle, Safety 3 mL 25 gauge x 1\" syrg Use to inject B12 by IM monthly 5 Pen Needle 2    Blood-Glucose Meter monitoring kit For use to check blood sugar once a day in morning before eating. Please dispense brand covered by insurance. 1 Kit 0       Allergies   Allergen Reactions    Penicillins Atopic Dermatitis    Tetanus And Diphtheria Toxoids Unknown (comments)     In childhood       ROS:  ROS negative except as per HPI. PE:  Visit Vitals  /84 (BP 1 Location: Right arm, BP Patient Position: Sitting, BP Cuff Size: Adult)   Pulse 95   Temp 97.2 °F (36.2 °C) (Oral)   Resp 18   Ht 5' 7\" (1.702 m)   Wt 270 lb 8 oz (122.7 kg)   SpO2 97%   BMI 42.37 kg/m²     Gen: alert, oriented, no acute distress  Head: normocephalic, atraumatic  Eyes: pupils equal round reactive to light, sclera clear, conjunctiva clear  Neck: symmetric normal sized thyroid, no carotid bruits  Resp: no increase work of breathing, lungs clear to ausculation bilaterally, no wheezing, rales or rhonchi  CV: S1, S2 normal.  No murmurs, rubs, or gallops. Abd: soft, not tender, not distended. Normal bowel sounds.     Skin: no lesion or rash  Extremities: (+)faint B pitting pedal edema    Results for orders placed or performed in visit on 07/08/21   AMB POC HEMOGLOBIN A1C   Result Value Ref Range    Hemoglobin A1c (POC) 9.1 (A) 4.5 - 5.7 %     Diabetic foot exam:     Left Foot:   Visual Exam: normal    Pulse DP: 2+ (normal)   Filament test: absent sensation , except was able to feel on lateral 5th toe and plantar MTP 4-5         Right Foot:   Visual Exam: normal    Pulse DP: 2+ (normal)   Filament test: absent sensation , except was able to feel on the heel only           Assessment/Plan:    1. Uncontrolled type 2 diabetes mellitus with hyperglycemia (HCC)  Today A1C 9.1%  -On metformin 1000 mg BID (increased from 500 mg BID at last visit 5/4/2020), and was on Victoza 1.2 mg daily (started 3/16/2020), however  she has been off of Victoza since March 2021.  -Last A1C 5.7% on 7/29/2020; previously 10.7% on 1/23/2020.  -RESUME  Victoza, continue current metformin  -On statin and ACE-I.   -LDL 60 on 3/2020  -she has never had DEP, will refer  - Labs on 3/18/2020 showed + microalbuminuria.  -She will schedule her diabetic eye exam  -majority absent sensation on foot exam today, foot care reviewed    - AMB POC HEMOGLOBIN A1C  - REFERRAL TO DIABETIC EDUCATION  -  DIABETES FOOT EXAM  - MICROALBUMIN, UR, RAND W/ MICROALB/CREAT RATIO; Future  - METABOLIC PANEL, COMPREHENSIVE; Future  - CBC WITH AUTOMATED DIFF; Future  - LIPID PANEL; Future  - LIPID PANEL  - CBC WITH AUTOMATED DIFF  - METABOLIC PANEL, COMPREHENSIVE  - MICROALBUMIN, UR, RAND W/ MICROALB/CREAT RATIO    2. Hypercholesterolemia with hypertriglyceridemia  -started atorvastatin 20 mg in Feb 2020; TG were 1,466 at onset of DM diagnosis, now  at last check 3/18/2020.  --LDL 60 on 3/2020    - atorvastatin (LIPITOR) 20 mg tablet; Take 1 Tablet by mouth daily. Dispense: 90 Tablet; Refill: 1  - LIPID PANEL; Future  - LIPID PANEL    3. Numbness and tingling  Due to diabetic neuropathy  - DULoxetine (CYMBALTA) 30 mg capsule; Take 1 Capsule by mouth daily. Dispense: 90 Capsule; Refill: 1    4. Neuropathy  Increased tingling related to worsening control of diabetes/blood sugar  Continue current dose cymbalta, resume victoza for better diabetes control    - DULoxetine (CYMBALTA) 30 mg capsule; Take 1 Capsule by mouth daily. Dispense: 90 Capsule; Refill: 1    5. Type 2 diabetes mellitus with hyperglycemia, without long-term current use of insulin (HCC)    - DULoxetine (CYMBALTA) 30 mg capsule;  Take 1 Capsule by mouth daily. Dispense: 90 Capsule; Refill: 1  - liraglutide (VICTOZA) 0.6 mg/0.1 mL (18 mg/3 mL) pnij; 1.2 mg by SubCUTAneous route daily. (Since you had run out, do 0.6 mg SQ daily for the first week, then go up to the 1.2 mg dose and stay)  Dispense: 6 Adjustable Dose Pre-filled Pen Syringe; Refill: 1  - metFORMIN (GLUCOPHAGE) 1,000 mg tablet; TAKE ONE TABLET BY MOUTH TWICE A DAY WITH A MEAL. Office visit/labs due before next refill  Dispense: 180 Tablet; Refill: 1  - pen needle, diabetic (NovoTwist) 32 gauge x 1/5\" ndle; 30 UNSPECIFIED by Does Not Apply route daily. Use with Victoza daily  Dispense: 100 Pen Needle; Refill: 1    6. Drug-induced constipation    - liraglutide (VICTOZA) 0.6 mg/0.1 mL (18 mg/3 mL) pnij; 1.2 mg by SubCUTAneous route daily. (Since you had run out, do 0.6 mg SQ daily for the first week, then go up to the 1.2 mg dose and stay)  Dispense: 6 Adjustable Dose Pre-filled Pen Syringe; Refill: 1    7. Microalbuminuria  -- Labs on 3/18/2020 showed + microalbuminuria. - liraglutide (VICTOZA) 0.6 mg/0.1 mL (18 mg/3 mL) pnij; 1.2 mg by SubCUTAneous route daily. (Since you had run out, do 0.6 mg SQ daily for the first week, then go up to the 1.2 mg dose and stay)  Dispense: 6 Adjustable Dose Pre-filled Pen Syringe; Refill: 1  - MICROALBUMIN, UR, RAND W/ MICROALB/CREAT RATIO; Future  - MICROALBUMIN, UR, RAND W/ MICROALB/CREAT RATIO    8. Obesity, morbid ([address]  -weight is up 20 lbs from last year, from 250 lbs to 270 lbs  She has been off ov her victoza since March 2021, will resume today    - liraglutide (VICTOZA) 0.6 mg/0.1 mL (18 mg/3 mL) pnij; 1.2 mg by SubCUTAneous route daily. (Since you had run out, do 0.6 mg SQ daily for the first week, then go up to the 1.2 mg dose and stay)  Dispense: 6 Adjustable Dose Pre-filled Pen Syringe; Refill: 1    9. Essential hypertension  HTN - well controlled, on  Lisinopril 10 mg. Continue current medication.  Exercise, and low salt/ low caffeine diet were discussed. - lisinopriL (PRINIVIL, ZESTRIL) 10 mg tablet; TAKE ONE TABLET BY MOUTH ONE TIME DAILY, DUE FOR OFFICE VISIT FOR BP CHECK  Dispense: 90 Tablet; Refill: 1    10. Vitamin B12 deficiency  last level 286 on 1/23/2020, she is giving her B12 at home by her sister  - VITAMIN B12 & FOLATE; Future  - VITAMIN B12 & FOLATE    11. Vitamin D deficiency  Currently on D3 2000 units daily    - VITAMIN D, 25 HYDROXY; Future  - VITAMIN D, 25 HYDROXY    12. Localized edema  -low salt diet reviewed, elevation, adequate water intake, calf exercises when seated    - TSH 3RD GENERATION; Future  - TSH 3RD GENERATION    13. Gallbladder polyp  2 GB polyps noted on US 8/2020  She has not yet scheduled with GI, will refer again  Advised to follow low fat diet  Denies RUQ pain or sxs with eating    - REFERRAL TO GASTROENTEROLOGY  - METABOLIC PANEL, COMPREHENSIVE; Future  - GGT; Future  - GGT  - METABOLIC PANEL, COMPREHENSIVE    14. Elevated liver enzymes  ALT 39, Alk Phos 170; on 1/2020,  in setting of newly diagnosed uncontrolled DM,  -recheck 7/29/2020, ALT 54, AST 24, Alk Phos 180  -RUQ US 8/2020 showed gallbladder polyp, normal hepatic parenchyma    - METABOLIC PANEL, COMPREHENSIVE; Future  - GGT; Future  - GGT  - METABOLIC PANEL, COMPREHENSIVE      Health Maintenance reviewed - updated.     Orders Placed This Encounter    MICROALBUMIN, UR, RAND W/ MICROALB/CREAT RATIO     Standing Status:   Future     Number of Occurrences:   1     Standing Expiration Date:   1/3/4361    METABOLIC PANEL, COMPREHENSIVE     Standing Status:   Future     Number of Occurrences:   1     Standing Expiration Date:   7/8/2022    CBC WITH AUTOMATED DIFF     Standing Status:   Future     Number of Occurrences:   1     Standing Expiration Date:   7/8/2022    TSH 3RD GENERATION     Standing Status:   Future     Number of Occurrences:   1     Standing Expiration Date:   7/8/2022    VITAMIN D, 25 HYDROXY     Standing Status:   Future     Number of Occurrences:   1     Standing Expiration Date:   2022    VITAMIN B12 & FOLATE     Standing Status:   Future     Number of Occurrences:   1     Standing Expiration Date:   2022    LIPID PANEL     Standing Status:   Future     Number of Occurrences:   1     Standing Expiration Date:   2022    GGT     Standing Status:   Future     Number of Occurrences:   1     Standing Expiration Date:   2022    REFERRAL TO DIABETIC EDUCATION     Referral Priority:   Routine     Referral Type:   Consultation     Referral Reason:   Specialty Services Required     Number of Visits Requested:   1    [id number] [facility]     Referral Priority:   Routine     Referral Type:   Consultation     Referral Reason:   Specialty Services Required     Referred to Provider:   Michelle Chavira MD     Number of Visits Requested:   1    AMB POC HEMOGLOBIN A1C    HM DIABETES FOOT EXAM    atorvastatin (LIPITOR) 20 mg tablet     Sig: Take 1 Tablet by mouth daily. Dispense:  90 Tablet     Refill:  1    DULoxetine (CYMBALTA) 30 mg capsule     Sig: Take 1 Capsule by mouth daily. Dispense:  90 Capsule     Refill:  1    liraglutide (VICTOZA) 0.6 mg/0.1 mL (18 mg/3 mL) pnij     Si.2 mg by SubCUTAneous route daily. (Since you had run out, do 0.6 mg SQ daily for the first week, then go up to the 1.2 mg dose and stay)     Dispense:  6 Adjustable Dose Pre-filled Pen Syringe     Refill:  1    lisinopriL (PRINIVIL, ZESTRIL) 10 mg tablet     Sig: TAKE ONE TABLET BY MOUTH ONE TIME DAILY, DUE FOR OFFICE VISIT FOR BP CHECK     Dispense:  90 Tablet     Refill:  1    metFORMIN (GLUCOPHAGE) 1,000 mg tablet     Sig: TAKE ONE TABLET BY MOUTH TWICE A DAY WITH A MEAL. Office visit/labs due before next refill     Dispense:  180 Tablet     Refill:  1    pen needle, diabetic (NovoTwist) 32 gauge x 1/\" ndle     Si UNSPECIFIED by Does Not Apply route daily.  Use with Victoza daily     Dispense:  100 Pen Needle     Refill:  1 Medications Discontinued During This Encounter   Medication Reason    DULoxetine (CYMBALTA) 30 mg capsule REORDER    liraglutide (VICTOZA) 0.6 mg/0.1 mL (18 mg/3 mL) pnij REORDER    pen needle, diabetic (NovoTwist) 32 gauge x 1/5\" ndle REORDER    lisinopriL (PRINIVIL, ZESTRIL) 10 mg tablet REORDER    metFORMIN (GLUCOPHAGE) 1,000 mg tablet REORDER    atorvastatin (LIPITOR) 20 mg tablet REORDER       Current Outpatient Medications   Medication Sig Dispense Refill    atorvastatin (LIPITOR) 20 mg tablet Take 1 Tablet by mouth daily. 90 Tablet 1    DULoxetine (CYMBALTA) 30 mg capsule Take 1 Capsule by mouth daily. 90 Capsule 1    liraglutide (VICTOZA) 0.6 mg/0.1 mL (18 mg/3 mL) pnij 1.2 mg by SubCUTAneous route daily. (Since you had run out, do 0.6 mg SQ daily for the first week, then go up to the 1.2 mg dose and stay) 6 Adjustable Dose Pre-filled Pen Syringe 1    lisinopriL (PRINIVIL, ZESTRIL) 10 mg tablet TAKE ONE TABLET BY MOUTH ONE TIME DAILY, DUE FOR OFFICE VISIT FOR BP CHECK 90 Tablet 1    metFORMIN (GLUCOPHAGE) 1,000 mg tablet TAKE ONE TABLET BY MOUTH TWICE A DAY WITH A MEAL. Office visit/labs due before next refill 180 Tablet 1    pen needle, diabetic (NovoTwist) 32 gauge x 1/5\" ndle 30 UNSPECIFIED by Does Not Apply route daily. Use with Victoza daily 100 Pen Needle 1    cyanocobalamin (VITAMIN B12) 1,000 mcg/mL injection 1 mL by IntraMUSCular route every thirty (30) days. 3 Vial 1    glucose blood VI test strips (ASCENSIA AUTODISC VI, ONE TOUCH ULTRA TEST VI) strip For use with glucometer to check blood sugar once a day in morning before eating. Please dispense brand covered by insurance. 100 Strip 2    lancets misc For use with glucometer to check blood sugar once a day in morning before eating. Please dispense brand covered by insurance.  1 Each 11    Syringe with Needle, Safety 3 mL 25 gauge x 1\" syrg Use to inject B12 by IM monthly 5 Pen Needle 2    Blood-Glucose Meter monitoring kit For use to check blood sugar once a day in morning before eating. Please dispense brand covered by insurance. 1 Kit 0    Syringe with Needle, Safety (BD Integra Syringe) 3 mL 25 gauge x 5/8\" syrg Use as directed monthly (Patient not taking: Reported on 7/8/2021) 3 Pen Needle 1    ergocalciferol (ERGOCALCIFEROL) 1,250 mcg (50,000 unit) capsule Take 1 Cap by mouth every seven (7) days. (Patient not taking: Reported on 7/8/2021) 13 Cap 1    multivitamin (ONE A DAY) tablet Take 1 Tab by mouth daily. (Patient not taking: Reported on 7/8/2021)      ibuprofen (MOTRIN) 200 mg tablet Take 200 mg by mouth every eight (8) hours as needed for Pain. (Patient not taking: Reported on 7/8/2021)         Recommended healthy diet low in carbohydrates, fats, sodium and cholesterol. Recommended regular cardiovascular exercise 3-6 times per week for 30-60 minutes daily. Verbal and written instructions (see AVS) provided. Patient expresses understanding of diagnosis and treatment plan. Follow-up and Dispositions    · Return in about 1 month (around 8/8/2021) for DM.        Future Appointments   Date Time Provider Daniel Wynn   8/12/2021  2:00 PM Sil Blackburn PA-C [facility]

## 2021-07-09 LAB
25(OH)D3+25(OH)D2 SERPL-MCNC: 28.6 NG/ML (ref 30–100)
ALBUMIN SERPL-MCNC: 4.3 G/DL (ref 3.8–4.9)
ALBUMIN/GLOB SERPL: 1.5 {RATIO} (ref 1.2–2.2)
ALP SERPL-CCNC: 195 IU/L (ref 48–121)
ALT SERPL-CCNC: 30 IU/L (ref 0–32)
AST SERPL-CCNC: 19 IU/L (ref 0–40)
BASOPHILS # BLD AUTO: 0.1 X10E3/UL (ref 0–0.2)
BASOPHILS NFR BLD AUTO: 1 %
BILIRUB SERPL-MCNC: 0.5 MG/DL (ref 0–1.2)
BUN SERPL-MCNC: 26 MG/DL (ref 6–24)
BUN/CREAT SERPL: 34 (ref 9–23)
CALCIUM SERPL-MCNC: 9.7 MG/DL (ref 8.7–10.2)
CHLORIDE SERPL-SCNC: 101 MMOL/L (ref 96–106)
CHOLEST SERPL-MCNC: 248 MG/DL (ref 100–199)
CO2 SERPL-SCNC: 18 MMOL/L (ref 20–29)
CREAT SERPL-MCNC: 0.77 MG/DL (ref 0.57–1)
EOSINOPHIL # BLD AUTO: 0.4 X10E3/UL (ref 0–0.4)
EOSINOPHIL NFR BLD AUTO: 4 %
ERYTHROCYTE [DISTWIDTH] IN BLOOD BY AUTOMATED COUNT: 13.2 % (ref 11.7–15.4)
FOLATE SERPL-MCNC: 9.9 NG/ML
GGT SERPL-CCNC: 148 IU/L (ref 0–60)
GLOBULIN SER CALC-MCNC: 2.8 G/DL (ref 1.5–4.5)
GLUCOSE SERPL-MCNC: 212 MG/DL (ref 65–99)
HCT VFR BLD AUTO: 40.6 % (ref 34–46.6)
HDLC SERPL-MCNC: 42 MG/DL
HGB BLD-MCNC: 13.5 G/DL (ref 11.1–15.9)
IMM GRANULOCYTES # BLD AUTO: 0 X10E3/UL (ref 0–0.1)
IMM GRANULOCYTES NFR BLD AUTO: 0 %
LDLC SERPL CALC-MCNC: 107 MG/DL (ref 0–99)
LYMPHOCYTES # BLD AUTO: 2.4 X10E3/UL (ref 0.7–3.1)
LYMPHOCYTES NFR BLD AUTO: 29 %
MCH RBC QN AUTO: 29.9 PG (ref 26.6–33)
MCHC RBC AUTO-ENTMCNC: 33.3 G/DL (ref 31.5–35.7)
MCV RBC AUTO: 90 FL (ref 79–97)
MONOCYTES # BLD AUTO: 0.5 X10E3/UL (ref 0.1–0.9)
MONOCYTES NFR BLD AUTO: 6 %
NEUTROPHILS # BLD AUTO: 4.8 X10E3/UL (ref 1.4–7)
NEUTROPHILS NFR BLD AUTO: 60 %
PLATELET # BLD AUTO: 346 X10E3/UL (ref 150–450)
POTASSIUM SERPL-SCNC: 4.8 MMOL/L (ref 3.5–5.2)
PROT SERPL-MCNC: 7.1 G/DL (ref 6–8.5)
RBC # BLD AUTO: 4.52 X10E6/UL (ref 3.77–5.28)
SODIUM SERPL-SCNC: 139 MMOL/L (ref 134–144)
TRIGL SERPL-MCNC: 572 MG/DL (ref 0–149)
TSH SERPL DL<=0.005 MIU/L-ACNC: 2.85 UIU/ML (ref 0.45–4.5)
VIT B12 SERPL-MCNC: 279 PG/ML (ref 232–1245)
VLDLC SERPL CALC-MCNC: 99 MG/DL (ref 5–40)
WBC # BLD AUTO: 8.2 X10E3/UL (ref 3.4–10.8)

## 2021-07-13 DIAGNOSIS — E53.8 VITAMIN B12 DEFICIENCY: ICD-10-CM

## 2021-07-13 LAB
ALBUMIN/CREAT UR: 114 MG/G CREAT (ref 0–29)
CREAT UR-MCNC: 344.7 MG/DL
MICROALBUMIN UR-MCNC: 393.3 UG/ML

## 2021-07-13 RX ORDER — CYANOCOBALAMIN 1000 UG/ML
1000 INJECTION, SOLUTION INTRAMUSCULAR; SUBCUTANEOUS
Qty: 4 VIAL | Refills: 0 | Status: SHIPPED | OUTPATIENT
Start: 2021-07-13 | End: 2021-10-14 | Stop reason: SDUPTHER

## 2021-07-13 NOTE — PROGRESS NOTES
Praneeth msg sent  Liver enzymes are up that are associated with the gallbladder. Be sure to schedule with the GI doctor to evaluate the gallbladder polyps    The triglycerides are going back up again, which can occur with the uncontrolled diabetes. Follow low fat, low carb diet, and we will recheck in 3 months. The B12 level is still low. I want you to do a B12 injection once a week for 4 weeks, then go back to once monthly, and we will recheck in 3 months. I will send in more supplies to your pharmacy. The vitamin D level is slightly low. Take the D3 2000 units twice a day for 3 months and then return to once daily.

## 2021-07-16 NOTE — PROGRESS NOTES
Mychart msg sent  Some protein in the urine from the diabetes, but this should improve as we get the diabetes under control. Be sure to stay well hydrated with good daily water intake.

## 2021-07-22 ENCOUNTER — CLINICAL SUPPORT (OUTPATIENT)
Dept: DIABETES SERVICES | Age: 56
End: 2021-07-22
Payer: COMMERCIAL

## 2021-07-22 DIAGNOSIS — E11.65 UNCONTROLLED TYPE 2 DIABETES MELLITUS WITH HYPERGLYCEMIA (HCC): Primary | ICD-10-CM

## 2021-07-22 PROCEDURE — G0108 DIAB MANAGE TRN  PER INDIV: HCPCS

## 2021-07-23 NOTE — PROGRESS NOTES
[facility]  Diabetes Self-Management Education & Support Program  Pre-program Assessment    Reason for Referral: Increased A1c over last 12 months  Referral Source: Salzaar Briscoe PA-C  Services requested: DSMES    ASSESSMENT    From my perspective, the participant would benefit from [facility] specifically related to Reducing risks, Healthy eating, Monitoring, Physical activity, Taking medications, Healthy coping and Problem solving. Will adapt DSMES program to build on participant's skills score, confidence score and preparedness score as noted in the Diabetes Skills, Confidence, and Preparedness Index. During the program, we will focus on providing DSMES that specifically addresses participant's interest in Reducing risks, Healthy eating, Monitoring, Physical activity, Taking medications, Healthy coping and Problem solving, as shown by their reported readiness to change. The participant would be best served by attending weekly individual sessions. Diabetes Self-Management Education Follow-up Visit: 7/30/2021 with me       Clinical Presentation  John Cadet is a 60 y.o. White female referred for diabetes self-management education. Participant has Type 2 DM not on insulin for 1-10 years. Family history positive for diabetes. Patient reports not receiving DSMES services in the past. Most recent A1c value:   Lab Results   Component Value Date/Time    Hemoglobin A1c 5.7 (H) 07/29/2020 07:57 AM    Hemoglobin A1c (POC) 9.1 (A) 07/08/2021 09:14 AM       Diabetes-related medical history:  Neurological complications  Peripheral neuropathy  Microvascular disease  Retinopathy  Other associated conditions     Vitamin B12 deficiency    Diabetes-related medications:  Current dosing:   Key Antihyperglycemic Medications             liraglutide (VICTOZA) 0.6 mg/0.1 mL (18 mg/3 mL) pnij 1.2 mg by SubCUTAneous route daily.  (Since you had run out, do 0.6 mg SQ daily for the first week, then go up to the 1.2 mg dose and stay)    metFORMIN (GLUCOPHAGE) 1,000 mg tablet TAKE ONE TABLET BY MOUTH TWICE A DAY WITH A MEAL. Office visit/labs due before next refill          Blood Pressure Management  Key ACE/ARB Medications             lisinopriL (PRINIVIL, ZESTRIL) 10 mg tablet TAKE ONE TABLET BY MOUTH ONE TIME DAILY, DUE FOR OFFICE VISIT FOR BP CHECK          Lipid Management  Key Antihyperlipidemia Meds             atorvastatin (LIPITOR) 20 mg tablet Take 1 Tablet by mouth daily. Clot Prevention  Key Anti-Platelet Anticoagulant Meds     The patient is on no antiplatelet meds or anticoagulants. Learning Assessment  Learning objectives Educator assessment (7/23/2021)   Diabetes Disease Process  The participant can   A) describe diabetes in basic terms;   B) state the type of diabetes they have; &   C) state accepted blood glucose targets. Healthy Eating  The participant can   A) identify carbohydrate foods; &   B) accurately read food labels. Being Active  The participant can  A) state the benefits of physical activity;  B) report their current PA practices;  C) identify PA they would consider incorporating in their lives; &  D) develop an implementation plan. Monitoring  The participant can  A) operate their blood glucose meter; &  B) describe how they log their blood glucoses to share with their provider. Taking Medications  The participant can  A) name their diabetes medications;  B) state the purpose and dose;  C) note side effects; &  D) describe proper storage, disposal & transport (if appropriate). Healthy Coping  The participant can    A) describe their response to diabetes diagnosis; B) describe their specific coping mechanisms;  C) identify supportive people and/or other resources that positively support their diabetes self-care and health.     Reducing Risks  The participant can describe the preventive measures used by providers to promote health and prevent diabetes complications. Problem Solving  The participant can   A) identify signs, symptoms & treatment of hypoglycemia;   B) identify signs, symptoms & treatment of hyperglycemia;  C) describe their sick day plan; &  D) identify BG patterns to discuss with their provider.        Yes  Yes  No        No  No        No  No  No  No        Yes  No          Yes  No  Yes  Yes      Yes  Yes  Yes        Yes          No  No  No  No     Characteristics to Learning   Barriers to Learning   [] Cognitive loss  [] Mental retardation   [] Intellectual delay/cognitive impairment  [] Psychiatric disorder  [] Visually impaired  [] Hearing loss                 [] Low literacy (difficulty with written text)  [] Low numeracy (difficulty with mathematical information  [] Low health literacy (difficulty with understanding health information & services  [] Language  [] Functional limitation  [] Pain   [] Financial  [] Transportation  [x] None   Favorite Ways to Learn   [x] Lecture  [] Slides  [x] Reading [] Video-Internet  [] Cassettes/CDs/MP3's  [] Interactive Small Groups [] Other       Behavioral Assessment  Current self-care practices  Educator assessment (7/23/2021)   Healthy Eating  Current practices  24-hour Dietary Recall:  Breakfast: 1/2 cup oatmeal w/ splenda brown sugar    Lunch: small natacha with egg salad or turkey, lettuce, tomato    Dinner: chicken or other meat, non-starchy vegetables, baked potato    Snacks: mini pretzles  Dessert: ice cream, frozen yogurt, or pretzels or popcorn    Beverages: sweet tea, water    Alcohol: rare     Would benefit from DSMES related to Healthy Eating: Yes      Eats a carbohydrate controlled diet: No      Stage of change: Preparation   Being Active  Current practices  How many days during the past week have you performed physical activity where your heart beats faster and your breathing is harder than normal for 30 minutes or more?  0 days    How many days in a typical week do you perform activity such as this?  0 days     Would benefit from [facility] related to Being Active: Yes      Exercises 150 minutes/week: No      Stage of change: Contemplation     Monitoring  Current practices  Do you monitor your blood sugar? Yes    How often do you monitor? 1x/day    What are the range of readings? 150-210 mg/dL  Breakfast: 150-210 mg/dL  Lunch: n/a mg/dL  Dinner: n/a mg/dL  Bedtime: n/a mg/dL    Do you know your last A1c measurement? Yes    Do you know the meaning of the A1c? No     Would benefit from [facility] related to Monitoring: Yes      Uses BG readings to establish trends and understand BG patterns: No      Stage of change: Preparation   Taking Medication  Current practices  Do you understand what your diabetes medications do? No    How often do you miss doses of your diabetes medications? Never    Can you afford your diabetes medications? Yes   Would benefit from [facility] related to Taking Medication: Yes      Takes medications consistently to receive full benefit: Yes      Stage of change: Action       Healthy Coping   Current state  Diabetes Skills, Confidence and Preparedness Index: Total score: 4.6  Skills: 2.9  Confidence: 5.1  Preparedness: 6.5   Would benefit from DSMES related to Healthy Coping: Yes      Identifies specific people, organizations,etc, that actively support their diabetes self-care efforts: Yes      Stage of change: Preparation     Reducing Risks  Current state  Vaccines:  Influenza:   Immunization History   Administered Date(s) Administered    Influenza Vaccine 10/18/2019, 10/02/2020       Pneumococcal: There is no immunization history for the selected administration types on file for this patient. Hepatitis: There is no immunization history for the selected administration types on file for this patient.     Examinations:  Diabetic Foot and Eye Exam HM Status   Topic Date Due    Eye Exam  Never done    Diabetic Foot Care  07/08/2022        Dental exam: DUE    Foot exam: Last appointment was: 7/8/2021    Heart Protection:  BP Readings from Last 2 Encounters:   07/08/21 136/84   03/16/20 131/64        Lab Results   Component Value Date/Time    LDL, calculated 107 (H) 07/08/2021 12:00 AM    LDL, calculated 60 03/18/2020 07:42 AM        Kidney Protection:  Lab Results   Component Value Date/Time    Microalb/Creat ratio (ug/mg creat.) 114 (H) 07/12/2021 12:00 AM        Would benefit from [facility] related to Reducing Risks: Yes      Actively participates in decision-making with provider regarding secondary prevention:  Yes      Stage of change: Preparation   Problem Solving  Current state  Hypoglycemia Management:  What are signs and symptoms of hypoglycemia that you experience? Sleepiness, irritability    How do you prevent hypoglycemia? Pt reported being unaware of how to prevent hypoglycemia    How do you treat hypoglycemia? Pt reported being unaware of how to treat hypoglycemia    Hyperglycemia Management:  What are signs and symptoms of hyperglycemia that you experience? Blurred vision, tingling in feet    How can you prevent hyperglycemia? Pt reported being unaware of how to prevent hyperglycemia    Sick Day Management:  What do you do differently on sick days? Pt reported being unaware of self-management on sick days    Pattern Management:  Do you notice blood glucose patterns when you look at the readings in your meter or logbook? No    How do you use the blood glucose readings from your meter or logbook?  Pt reported being unaware of pattern management skills     Would benefit from [facility] related to Problem Solving: Yes      Articulates appropriate strategies to address hypoglycemia, hyperglycemia, sick day care and BG pattern: No      Stage of change: Preparation     Note: Content derived from the American Association of Diabetes Educators' Diabetes Education Curriculum: A Guide to Successful Self-Management (3rd edition)      Lorna Salinas RN on 7/23/2021 at 4:49 PM    Time in appointment: 62 minutes

## 2021-08-06 ENCOUNTER — VIRTUAL VISIT (OUTPATIENT)
Dept: DIABETES SERVICES | Age: 56
End: 2021-08-06
Payer: COMMERCIAL

## 2021-08-06 DIAGNOSIS — E11.65 UNCONTROLLED TYPE 2 DIABETES MELLITUS WITH HYPERGLYCEMIA (HCC): Primary | ICD-10-CM

## 2021-08-06 PROCEDURE — G0108 DIAB MANAGE TRN  PER INDIV: HCPCS

## 2021-08-09 NOTE — PROGRESS NOTES
[facility]  Diabetes Self-Management Education & Support Program  Encounter note    SUMMARY  Diabetes self-care management training was completed related to Reducing risks. The participant will return on August 11 to continue DSMES related to Healthy eating. The participant did not identify SMART Goal(s): - n/a, and will practice knowledge and skills related to reducing risks, healthy eating and monitoring, being active and medications and healthy coping and problem solving to improve diabetes self-management. EVALUATION:  Ms. Michael House completed Reducing Risks DSMES today, she expressed understanding of the DM disease process, ADA targets for fasting and postprandial BGLs, and A1c, the preventive role of the influenza, pneumonia, and Hepatitis B vaccinations, and eye, dental, foot exams/health, as well as the relationship between DM and heart, kidney, nerve, and sleep health. She states her fasting BGLs over the past week have ranged from 170-212. Ms. Michael House is due for a dental exam, but is up to date on eye care and sees a specialist at the [address] [place] [address], she does experience blurry vision during episodes of hyperglycemia. She checks her feet daily and does experience numbness and tingling in both feet, her fingers, and her thumbs during episodes of hyperglycemia. Ms. Michael House is interested in exploring stress relieve techniques that can help her regain her sense of calm in the moment. We discussed using deep breathing, which she is ready to try, and she finds being around her dog while working from home to be helpful. At this time, exercise is a stressor rather than stress relief. We will continue to work on addressing stress throughout [facility]. Ms. Michael House is aware that her triglyceride, cholesterol, microalbumin, and gall bladder and liver enzymes are elevated.   She is optimistic that changes in nutrition, medication, and better BGL control will improve the triglyceride and cholesterol results. She has been advised by Angel Whitaker to make an appointment with a GI provider. Next week we will begin covering nutrition. RECOMMENDATIONS:  Schedule a dental appointment and GI provider appointment  Next provider visit is scheduled for 8/11/2021       SMART GOAL(S)  1.    ACHIEVEMENT OF GOAL(S)  [] 0-24%     [] 25-49%     [] 50-74%     [] %  2. ACHIEVEMENT OF GOAL(S)  [] 0-24%     [] 25-49%     [] 50-74%     [] %  3. ACHIEVEMENT OF GOAL(S)  [] 0-24%     [] 25-49%     [] 50-74%     [] %       DATE DSMES TOPIC EVALUATION     8/9/2021 WHAT IS DIABETES?   a. Role of the normal pancreas in energy balance and blood glucose control   b. The defect seen in diabetes   c. Signs & symptoms of diabetes   d. Diagnosis of diabetes   e. Types of diabetes   f. Blood glucose targets in non-pregnant & non-pregnant adults       The participant knows   Their type of diabetes Yes   The basic physiologic defect Yes   Blood glucose targets Yes     DATE DSMES TOPIC EVALUATION     8/9/2021 HOW DO I STAY HEALTHY?   a. Prevention    Vaccinations    Preconception care (if applicable)  b. Examinations    Eye     Dental   Foot   c. Diabetic complications' prevention    Heart health    Kidney Health    Nerve health    Sleep health      The participant has a personal diabetes care record to keep abreast of diabetes health Yes    The participant would benefit from scheduling a dental appointment. Tunde Crain RN on 8/9/2021 at 1:22 PM    [facility] for Telehealth:  Zackery Painting was evaluated through a synchronous (real-time) audio-video encounter. The patient (or guardian if applicable) is aware that this is a billable service. Verbal consent to proceed has been obtained within the past 12 months.  The visit was conducted pursuant to the emergency declaration under the [facility], [facility] waiver authority and the [employer] General Act. Patient identification was verified, and a caregiver was present when  appropriate. The patient was located in a state where the provider was credentialed to provide care.     Encounter date: 8/6/2021  Time in appointment: 62 minutes

## 2021-08-11 ENCOUNTER — VIRTUAL VISIT (OUTPATIENT)
Dept: DIABETES SERVICES | Age: 56
End: 2021-08-11
Payer: COMMERCIAL

## 2021-08-11 DIAGNOSIS — E11.65 UNCONTROLLED TYPE 2 DIABETES MELLITUS WITH HYPERGLYCEMIA (HCC): Primary | ICD-10-CM

## 2021-08-11 PROCEDURE — G0108 DIAB MANAGE TRN  PER INDIV: HCPCS

## 2021-08-11 NOTE — PROGRESS NOTES
[employer] [facility]  Diabetes Self-Management Education & Support Program  Encounter note    SUMMARY  Diabetes self-care management training was completed related to Healthy eating. The participant will return on August 20 to continue DSMES related to Healthy eating and Monitoring. The participant did identify SMART Goal(s): - Check BGL after meals 2 times over the next week to evaluate impact of carbs on BGLs, and will practice knowledge and skills related to reducing risks, healthy eating and monitoring, being active and medications and healthy coping and problem solving to improve diabetes self-management. EVALUATION:  Ms. Juanis Wolf was seen virtually today to cover the material on nutrition. She reports her fasting BGLs have been 174-212, about the same as last week, she is feeling very frustrated because she anticipated her BGLs would be lower because she has skipped meals. After covering the material she expressed understanding how the body compensates when its not fed and that this can raise BGLs. She feels confident she can work on eating 3 balanced meals a day and says she thinks her BGLs are related to a combination of skipping meals and too many carbs at the meals she does have. She also expressed understanding of the ADA nutrition guidelines including eating 3 meals a day spaced 4-5 hours apart, eating breakfast to jump start metabolism, choosing nutrient dense carbs and healthier fats, limiting table salt, the sources of carbs, the role of proteins, fats, and carbs in nutrition, how to use Healthy Plate and carb counting, the recommend carb range to maintain in target BGLs, how to read nutrition labels, and helpful nutrition apps/websites.       Ms. Lauro Bowman for the week is to check her BGL after meals 2 times, she is also going to continue checking her fasting BGLs, work on eating 3 meals a day, and count the carbs in her current meal choices to see where she can make small changes to bring her carbs in line with the 45g carbs/meal recommendation. RECOMMENDATIONS:  Ms. Brock Trujillo would benefit from keeping a food diary to help her see the connections between meal choices and BGLs. Next provider visit is scheduled for 8/20/2021       SMART GOAL(S)  1. Check BGL after meals 2 times over the next week to evaluate impact of carbs on BGLs  ACHIEVEMENT OF GOAL(S)  [] 0-24%     [] 25-49%     [] 50-74%     [] %  2. ACHIEVEMENT OF GOAL(S)  [] 0-24%     [] 25-49%     [] 50-74%     [] %  3. ACHIEVEMENT OF GOAL(S)  [] 0-24%     [] 25-49%     [] 50-74%     [] %       DATE DSMES TOPIC EVALUATION     8/11/2021 WHAT CAN I EAT?   a. General principles   b. Determining a healthy weight   c. Nutritional terms & tools    Healthy Plate method    Carbohydrate Counting    Reading food labels    Free apps   d. Pregnancy recommendations      The participant    Uses Healthy Plate principles in constructing meals Yes   Reads food labels in choosing acceptable foods Yes    The participant would benefit from taking the week before our next appointment to evaluate current carbs content at each meal and determine where small changes can be made to begin modifying meal choices to bring them more in line with recommended carb range. Esa Calvin RN on 8/11/2021 at 1:27 PM    [facility] for Telehealth:  Adriano Slade was evaluated through a synchronous (real-time) audio-video encounter. The patient (or guardian if applicable) is aware that this is a billable service. Verbal consent to proceed has been obtained within the past 12 months. The visit was conducted pursuant to the emergency declaration under the [facility], [address] [place] [address] authority and the [employer] General Act.  Patient identification was verified, and a caregiver was present when appropriate. The patient was located in a state where the provider was credentialed to provide care.     Encounter date: 8/11/2021  Time in appointment: 65 minutes

## 2021-08-12 ENCOUNTER — OFFICE VISIT (OUTPATIENT)
Dept: INTERNAL MEDICINE CLINIC | Age: 56
End: 2021-08-12
Payer: COMMERCIAL

## 2021-08-12 VITALS
TEMPERATURE: 97.4 F | HEART RATE: 85 BPM | WEIGHT: 260.4 LBS | DIASTOLIC BLOOD PRESSURE: 78 MMHG | SYSTOLIC BLOOD PRESSURE: 126 MMHG | OXYGEN SATURATION: 98 % | HEIGHT: 67 IN | BODY MASS INDEX: 40.87 KG/M2 | RESPIRATION RATE: 18 BRPM

## 2021-08-12 DIAGNOSIS — E11.319 CONTROLLED TYPE 2 DIABETES MELLITUS WITH RETINOPATHY OF LEFT EYE, WITHOUT LONG-TERM CURRENT USE OF INSULIN, MACULAR EDEMA PRESENCE UNSPECIFIED, UNSPECIFIED RETINOPATHY SEVERITY (HCC): Primary | ICD-10-CM

## 2021-08-12 DIAGNOSIS — E53.8 VITAMIN B12 DEFICIENCY: ICD-10-CM

## 2021-08-12 DIAGNOSIS — K82.4 GALLBLADDER POLYP: ICD-10-CM

## 2021-08-12 DIAGNOSIS — I10 ESSENTIAL HYPERTENSION: ICD-10-CM

## 2021-08-12 DIAGNOSIS — E78.2 HYPERCHOLESTEROLEMIA WITH HYPERTRIGLYCERIDEMIA: ICD-10-CM

## 2021-08-12 DIAGNOSIS — E55.9 VITAMIN D DEFICIENCY: ICD-10-CM

## 2021-08-12 LAB — HBA1C MFR BLD HPLC: 7.9 % (ref 4.5–5.7)

## 2021-08-12 PROCEDURE — 3051F HG A1C>EQUAL 7.0%<8.0%: CPT | Performed by: PHYSICIAN ASSISTANT

## 2021-08-12 PROCEDURE — 99214 OFFICE O/P EST MOD 30 MIN: CPT | Performed by: PHYSICIAN ASSISTANT

## 2021-08-12 PROCEDURE — 83036 HEMOGLOBIN GLYCOSYLATED A1C: CPT | Performed by: PHYSICIAN ASSISTANT

## 2021-08-12 NOTE — PATIENT INSTRUCTIONS
Diabetic Retinopathy: Care Instructions  Your Care Instructions     Diabetes can damage the small blood vessels in part of your eye. This part of the eye is called the retina. It detects light that enters the eye. Then it sends signals to your brain about what the eye sees. When this type of eye damage happens, it's called diabetic retinopathy. It can lead to poor vision and even blindness. But if you keep your blood sugar and blood pressure levels in your target range, you can help avoid or slow the damage. Follow-up care is a key part of your treatment and safety. Be sure to make and go to all appointments, and call your doctor if you are having problems. It's also a good idea to know your test results and keep a list of the medicines you take. How can you care for yourself at home? · Have regular eye exams. Tell your doctor about any changes in your vision. · Keep blood sugar in your target range. ? Eat a variety of healthy foods, and spread carbohydrate throughout the day. You may want to have a dietitian help you plan meals. ? If your doctor recommends it, get more exercise. Walking is a good choice. Bit by bit, increase the amount you walk every day. Try for at least 30 minutes on most days of the week. ? Be safe with medicines. Take your medicine exactly as prescribed. Call your doctor if you think you are having a problem with your medicine. ? Check your blood sugar as often as your doctor recommends. · Eat a low-salt diet. This may help keep your blood pressure at a normal level. You may also need to take medicines to reach your goals. · Do not smoke. Smoking can make this condition worse. If you need help quitting, talk to your doctor about stop-smoking programs and medicines. These can increase your chances of quitting for good. · Avoid risky activities. These include things like weight lifting and some contact sports.  They may trigger bleeding in the eye through impact or increased pressure. When should you call for help? Call your doctor now or seek immediate medical care if:    · You have vision changes. Watch closely for changes in your health, and be sure to contact your doctor if:    · You do not get better as expected. Where can you learn more? Go to http://www.gray.com/  Enter R396 in the search box to learn more about \"Diabetic Retinopathy: Care Instructions. \"  Current as of: August 31, 2020               Content Version: 12.8  © 2006-2021 [employer]. Care instructions adapted under license by [employer] (which disclaims liability or warranty for this information). If you have questions about a medical condition or this instruction, always ask your healthcare professional. [address] any warranty or liability for your use of this information.

## 2021-08-12 NOTE — PROGRESS NOTES
Zackery Paniting is a 60 y.o. female     Chief Complaint   Patient presents with    Diabetes     follow up       Visit Vitals  /78 (BP 1 Location: Left arm, BP Patient Position: Sitting, BP Cuff Size: Adult)   Pulse 85   Temp 97.4 °F (36.3 °C) (Temporal)   Resp 18   Ht 5' 7\" (1.702 m)   Wt 260 lb 6.4 oz (118.1 kg)   SpO2 98%   BMI 40.78 kg/m²       Health Maintenance Due   Topic Date Due    Eye Exam Retinal or Dilated  Never done    DTaP/Tdap/Td series (1 - Tdap) Never done    PAP AKA CERVICAL CYTOLOGY  Never done    Colorectal Cancer Screening Combo  Never done    Shingrix Vaccine Age 50> (1 of 2) Never done    Breast Cancer Screen Mammogram  Never done       1. Have you been to the ER, urgent care clinic since your last visit? Hospitalized since your last visit? No    2. Have you seen or consulted any other health care providers outside of the [address] [place] [address] since your last visit? Include any pap smears or colon screening. No

## 2021-08-12 NOTE — PROGRESS NOTES
HPI:  60 y.o.  presents for follow up appointment. No hospital, ER or specialist visits since last primary care visit except as noted below. DM  Last visit 7/8/21 A1C 9.1%  -On metformin 1000 mg BID (increased from 500 mg BID at visit 5/4/2020), and was on Victoza 1.2 mg daily (started 3/16/2020), however  she has been off of Victoza since March 2021 - July 2021.  -Prior A1C 5.7% on 7/29/2020; previously 10.7% on 1/23/2020.  -RESUMED Victoza 1.2 mg x 1 month now with good tolerance, continued current metformin  -home BS testing shows -220  -On statin and ACE-I.   - on 7/8/2021, previously 60 on 3/2020  -referred to [facility], she has had several visits and she states she is learning a lot  - (+) microalbuminuria (139 on 3/2020; 114 on 7/2021). -She had  her diabetic eye exam, she needs to see retinal specialist since seeing changes to suggest diabetic retinopathy on OS  -majority absent sensation on foot exam 7/8/21, foot care reviewed    She continues to work from home as , but will return to office in October  She tends to skip meals, and has reviewed this with [facility] and now knows that increases her blood sugar when going too long without food  She has reviewed desk/chair exercises and stress reduction and sleep with [facility] class    After last labs:   Increased B12 up to qwk x 4 wks, then back to monthly  Taking OTC D3 2000 units daily  TG up - TODAY discussed adding fish oil 2000 mg BID OTC    Gallbladder polyp noted on US in 8/2020, she reports she saw Dr Jeremías Vela in a virtual visit in 10/2020 and doesn't recall what he said about it    Patient Active Problem List    Diagnosis    Microalbuminuria    Pedal edema    Drug-induced constipation    Controlled type 2 diabetes mellitus without complication, without long-term current use of insulin ([address]    Hypercholesterolemia with hypertriglyceridemia    Vitamin D deficiency    Vitamin B12 deficiency    Elevated hemoglobin ([address]    Essential hypertension    Obesity, morbid (HCC)    Numbness and tingling    Carpal tunnel syndrome of right wrist         Past Medical History:   Diagnosis Date    Carpal tunnel syndrome of right wrist     Cellulitis and abscess of left lower extremity     Diabetes ([address]     Hypercholesterolemia     Hypertension        Social History     Tobacco Use    Smoking status: Never Smoker    Smokeless tobacco: Never Used   Vaping Use    Vaping Use: Never used   Substance Use Topics    Alcohol use: Not Currently    Drug use: Never       Outpatient Medications Marked as Taking for the 8/12/21 encounter (Office Visit) with Sil Saha Arm, PA-C   Medication Sig Dispense Refill    cyanocobalamin (VITAMIN B12) 1,000 mcg/mL injection 1 mL by IntraMUSCular route every seven (7) days. For 4 weeks. Then return to once monthly. (THIS IS A NEW DOSE) 4 Vial 0    Syringe with Needle, Safety 3 mL 25 gauge x 1\" syrg Use to inject B12 by IM monthly 5 Pen Needle 2    atorvastatin (LIPITOR) 20 mg tablet Take 1 Tablet by mouth daily. 90 Tablet 1    DULoxetine (CYMBALTA) 30 mg capsule Take 1 Capsule by mouth daily. 90 Capsule 1    liraglutide (VICTOZA) 0.6 mg/0.1 mL (18 mg/3 mL) pnij 1.2 mg by SubCUTAneous route daily. (Since you had run out, do 0.6 mg SQ daily for the first week, then go up to the 1.2 mg dose and stay) 6 Adjustable Dose Pre-filled Pen Syringe 1    lisinopriL (PRINIVIL, ZESTRIL) 10 mg tablet TAKE ONE TABLET BY MOUTH ONE TIME DAILY, DUE FOR OFFICE VISIT FOR BP CHECK 90 Tablet 1    metFORMIN (GLUCOPHAGE) 1,000 mg tablet TAKE ONE TABLET BY MOUTH TWICE A DAY WITH A MEAL. Office visit/labs due before next refill 180 Tablet 1    pen needle, diabetic (NovoTwist) 32 gauge x 1/5\" ndle 30 UNSPECIFIED by Does Not Apply route daily.  Use with Victoza daily 100 Pen Needle 1    glucose blood VI test strips (ASCENSIA AUTODISC VI, ONE TOUCH ULTRA TEST VI) strip For use with glucometer to check blood sugar once a day in morning before eating. Please dispense brand covered by insurance. 100 Strip 2    lancets misc For use with glucometer to check blood sugar once a day in morning before eating. Please dispense brand covered by insurance. 1 Each 11    Blood-Glucose Meter monitoring kit For use to check blood sugar once a day in morning before eating. Please dispense brand covered by insurance. 1 Kit 0       Allergies   Allergen Reactions    Penicillins Atopic Dermatitis    Tetanus And Diphtheria Toxoids Unknown (comments)     In childhood       ROS:  ROS negative except as per HPI. PE:  Visit Vitals  /78 (BP 1 Location: Left arm, BP Patient Position: Sitting, BP Cuff Size: Adult)   Pulse 85   Temp 97.4 °F (36.3 °C) (Temporal)   Resp 18   Ht 5' 7\" (1.702 m)   Wt 260 lb 6.4 oz (118.1 kg)   SpO2 98%   BMI 40.78 kg/m²     Weight is down 10 lbs from 7/8/21    Gen: alert, oriented, no acute distress  Head: normocephalic, atraumatic  Eyes: pupils equal round reactive to light, sclera clear, conjunctiva clear  Neck: supple  Resp: no increase work of breathing  Skin: no lesion or rash  Extremities: no cyanosis or edema    Results for orders placed or performed in visit on 08/12/21   AMB POC HEMOGLOBIN A1C   Result Value Ref Range    Hemoglobin A1c (POC) 7.9 (A) 4.5 - 5.7 %       Assessment/Plan:    1.  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   - on 7/8/2021, previously 60 on 3/2020  -referred to [facility], she has had several visits and she states she is learning a lot  - (+) microalbuminuria (139 on 3/2020; 114 on 7/2021). -She had  her diabetic eye exam, she needs to see retinal specialist since seeing changes to suggest diabetic retinopathy on OS  -majority absent sensation on foot exam 7/8/21, foot care reviewed  -I praised her weight loss, down 10 lbs!      - AMB POC HEMOGLOBIN F8X  - METABOLIC PANEL, COMPREHENSIVE; Future  - HEMOGLOBIN A1C WITH EAG; Future    2. Essential hypertension  126/78 today,  on  Lisinopril 10 mg  HTN - well controlled. Continue current medication. Exercise, and low salt/ low caffeine diet were discussed. - METABOLIC PANEL, COMPREHENSIVE; Future    3. Vitamin B12 deficiency  last level 279 on 7/8/2021, previously 286 on 1/23/2020  I increased her home injection to B12 1000 mcg qwk x 4, then back to monthly, which she has been doing  Will recheck B12 at 3 months in October    - [address] [place] [address]; Future    4. Vitamin D deficiency  Last level 28.6  Taking OTC D3 2000 units daily    - VITAMIN D, 25 HYDROXY; Future    5. Hypercholesterolemia with hypertriglyceridemia  Last lipids 7/8/2021: Total 248, , HDL 42,   On atorvastatin 20 mg  Will add fish oil 2 pills BID  She is learning dietary changes from her DEP class  Recheck in October   - LIPID PANEL; Future    6. Gallbladder polyp  Gallbladder polyp noted on US in 8/2020, she reports she saw Dr Roseline Bourgeois in a virtual visit in 10/2020 and doesn't recall what he said about it  -I do not see consult note on chart so will request records for review      Next visit check B12, Vit D, lipids, A1C -  1 week prior to visit      Health Maintenance reviewed - updated.     Orders Placed This Encounter    METABOLIC PANEL, COMPREHENSIVE     Standing Status:   Future     Standing Expiration Date:   8/12/2022    VITAMIN B12 & FOLATE     Standing Status:   Future     Standing Expiration Date:   8/12/2022    VITAMIN D, 25 HYDROXY     Standing Status:   Future     Standing Expiration Date:   8/12/2022    LIPID PANEL     Standing Status:   Future     Standing Expiration Date:   8/12/2022    HEMOGLOBIN A1C WITH EAG     Standing Status:   Future     Standing Expiration Date:   8/12/2022    AMB POC HEMOGLOBIN A1C       There are no discontinued medications. Current Outpatient Medications   Medication Sig Dispense Refill    cyanocobalamin (VITAMIN B12) 1,000 mcg/mL injection 1 mL by IntraMUSCular route every seven (7) days. For 4 weeks. Then return to once monthly. (THIS IS A NEW DOSE) 4 Vial 0    Syringe with Needle, Safety 3 mL 25 gauge x 1\" syrg Use to inject B12 by IM monthly 5 Pen Needle 2    atorvastatin (LIPITOR) 20 mg tablet Take 1 Tablet by mouth daily. 90 Tablet 1    DULoxetine (CYMBALTA) 30 mg capsule Take 1 Capsule by mouth daily. 90 Capsule 1    liraglutide (VICTOZA) 0.6 mg/0.1 mL (18 mg/3 mL) pnij 1.2 mg by SubCUTAneous route daily. (Since you had run out, do 0.6 mg SQ daily for the first week, then go up to the 1.2 mg dose and stay) 6 Adjustable Dose Pre-filled Pen Syringe 1    lisinopriL (PRINIVIL, ZESTRIL) 10 mg tablet TAKE ONE TABLET BY MOUTH ONE TIME DAILY, DUE FOR OFFICE VISIT FOR BP CHECK 90 Tablet 1    metFORMIN (GLUCOPHAGE) 1,000 mg tablet TAKE ONE TABLET BY MOUTH TWICE A DAY WITH A MEAL. Office visit/labs due before next refill 180 Tablet 1    pen needle, diabetic (NovoTwist) 32 gauge x 1/5\" ndle 30 UNSPECIFIED by Does Not Apply route daily. Use with Victoza daily 100 Pen Needle 1    glucose blood VI test strips (ASCENSIA AUTODISC VI, ONE TOUCH ULTRA TEST VI) strip For use with glucometer to check blood sugar once a day in morning before eating. Please dispense brand covered by insurance. 100 Strip 2    lancets misc For use with glucometer to check blood sugar once a day in morning before eating. Please dispense brand covered by insurance. 1 Each 11    Blood-Glucose Meter monitoring kit For use to check blood sugar once a day in morning before eating. Please dispense brand covered by insurance.  1 Kit 0    ergocalciferol (ERGOCALCIFEROL) 1,250 mcg (50,000 unit) capsule Take 1 Cap by mouth every seven (7) days. (Patient not taking: Reported on 8/12/2021) 13 Cap 1    multivitamin (ONE A DAY) tablet Take 1 Tab by mouth daily. (Patient not taking: Reported on 7/8/2021)      ibuprofen (MOTRIN) 200 mg tablet Take 200 mg by mouth every eight (8) hours as needed for Pain. (Patient not taking: Reported on 7/8/2021)         Recommended healthy diet low in carbohydrates, fats, sodium and cholesterol. Recommended regular cardiovascular exercise 3-6 times per week for 30-60 minutes daily. Verbal and written instructions (see AVS) provided. Patient expresses understanding of diagnosis and treatment plan. Follow-up and Dispositions    · Return in about 9 weeks (around 10/14/2021) for DM, 2nd appt for fasting labs 1 week before next visit. Future Appointments   Date Time Provider Daniel Wynn   8/20/2021  1:00 PM Ad Figueredo, RN [facility] AMB   10/4/2021  8:00 AM LAB ONLY [facility] AMB   10/14/2021  2:30 PM Sil Blackburn PA-C [facility] AMB       Greater than 30 min was spent with her, of which more than 50% was spent on counseling.

## 2021-08-20 ENCOUNTER — VIRTUAL VISIT (OUTPATIENT)
Dept: DIABETES SERVICES | Age: 56
End: 2021-08-20
Payer: COMMERCIAL

## 2021-08-20 DIAGNOSIS — E11.65 UNCONTROLLED TYPE 2 DIABETES MELLITUS WITH HYPERGLYCEMIA (HCC): Primary | ICD-10-CM

## 2021-08-20 PROCEDURE — G0108 DIAB MANAGE TRN  PER INDIV: HCPCS

## 2021-08-20 NOTE — PROGRESS NOTES
[employer] [facility]  Diabetes Self-Management Education & Support Program  Encounter note    SUMMARY  Diabetes self-care management training was completed related to Healthy eating and Monitoring. The participant will return on August 27 to continue DSMES related to Physical activity and Taking medications. The participant did identify SMART Goal(s): - Practice building a balanced meal for lunch at least 3 times over the next week. , and will practice knowledge and skills related to reducing risks, healthy eating and monitoring, being active and medications and healthy coping and problem solving to improve diabetes self-management. EVALUATION:  Today we reviewed and completed Healthy Eating and completed Monitoring. Ms. Kim Puckett had many questions about nutrition since our last visit, she is doing a great job of applying the information from [facility] and is thinking critically about the impact of her meals on her BGLs. Her fasting BGLs are not yet in target range, she reports fasting BGLs from 130s-170s with one high of 205. Based on what she is eating for dinner most nights and the less than 15g carb snacks she occasionally eats between dinner and bed, her fasting numbers should be closer to range. She is often awake 2 or more hours before she checks her fasting BGLs. Ms. Kim Puckett stated that on the morning her fasting was 205 at 9:30 am, she had been awake working since 3:00 AM. I have advised her to check with in 30 minutes of waking to see if there is a change in her numbers, if not, there is room to go up on her Victoza if that is an option. Her 2-hour postprandials look great in the 100-120 range, which reflects good meal choices. She continues to work on spacing meals 4-5 hours apart and eating a well-balanced lunch. Ms. Saleem Jaimedania questions were answered to her satisfaction and she expressed understanding all information.     Ms. Kim Puckett denies any barriers related to monitoring her BGLs and is aware of when and why to test, target BGLs, proper testing technique, and is logging her results. She expressed understanding when and how to treat hypoglycemia. Next week we will cover medications and physical activity. RECOMMENDATIONS:  Ms. Diana Ernandez would benefit from checking her fasting BGL within 30 minutes of waking and eating a balanced lunch that includes carbs within the recommended range. Next provider visit is scheduled for 8/27/2021 with me       SMART GOAL(S)  1. Check BGL after meals 2 times over the next week to evaluate impact of carbs on BGLs  ACHIEVEMENT OF GOAL(S)  []? 0-24%     []? 25-49%     []? 50-74%     [x]? %  2. Practice building a balanced meal for lunch at least 3 times over the next week  ACHIEVEMENT OF GOAL(S)  []? 0-24%     []? 25-49%     []? 50-74%     []? %  3. ACHIEVEMENT OF GOAL(S)  []? 0-24%     []? 25-49%     []? 50-74%     []? %       DATE DSMES TOPIC EVALUATION     8/20/2021 WHAT CAN I EAT?   a. General principles   b. Determining a healthy weight   c. Nutritional terms & tools    Healthy Plate method    Carbohydrate Counting    Reading food labels    Free apps   d. Pregnancy recommendations      The participant    Uses Healthy Plate principles in constructing meals Yes   Reads food labels in choosing acceptable foods Yes    The participant would benefit from eat a balanced lunch most days. DATE DSMES TOPIC EVALUATION     8/20/2021 HOW CAN BLOOD GLUCOSE MONITORING HELP ME?   a. Value of blood glucose monitoring   b. Realistic expectations   c. Blood glucose monitoring targets   d. Target adjustments   e. Setting a1c & blood glucose targets with provider   f. Meter selection    g. Technique for obtaining blood droplet   h. Blood glucose testing sites   i. Determining best times to test   j. Pregnancy recommendations   k.  Data sharing with provider        The participant    Can demonstrate their glucometer procedure Yes   Logs their BG readings Yes    The participant would benefit from evaluating pre-meal and post-meal BGLs for each meal across a week to get information about BGL pattern. Tico Ludwig RN on 8/20/2021 at 2:31 PM    [facility] [employer] for Telehealth:  Chava Romero was evaluated through a synchronous (real-time) audio-video encounter. The patient (or guardian if applicable) is aware that this is a billable service. Verbal consent to proceed has been obtained within the past 12 months. The visit was conducted pursuant to the emergency declaration under the [address] [place] [address], [address] [place] [address] authority and the [employer] General Act. Patient identification was verified, and a caregiver was present when  appropriate. The patient was located in a state where the provider was credentialed to provide care.     Encounter date: 8/20/2021  Time in appointment: 91 minutes

## 2021-08-27 ENCOUNTER — VIRTUAL VISIT (OUTPATIENT)
Dept: DIABETES SERVICES | Age: 56
End: 2021-08-27
Payer: COMMERCIAL

## 2021-08-27 DIAGNOSIS — E11.65 UNCONTROLLED TYPE 2 DIABETES MELLITUS WITH HYPERGLYCEMIA (HCC): Primary | ICD-10-CM

## 2021-08-27 PROCEDURE — G0108 DIAB MANAGE TRN  PER INDIV: HCPCS

## 2021-08-27 NOTE — PROGRESS NOTES
[employer] [facility]  Diabetes Self-Management Education & Support Program  Encounter note    SUMMARY  Diabetes self-care management training was completed related to Taking medications. The participant will return on September 10 to continue DSMES related to Physical activity. The participant did not identify SMART Goal(s): - n/a, and will practice knowledge and skills related to reducing risks, healthy eating and monitoring, being active and medications and healthy coping and problem solving to improve diabetes self-management. EVALUATION:  Ms. Ivana Poole has met her current SMART Goals of checking 2-hour postprandial BGLs after 2 meals to evaluate impact of carbs on BG and building balanced lunch meals 3 times over the next week. She has seen a small drop in her fasting BGLs this week after beginning to check her glucose within 30 minutes of waking rather than 1-2 hours after waking and activity. Her preprandial BGLs are great ranging from 113-126, right in target range. Ms. Ivana Poole states that since evaluating her fasting, pre and postprandial BGLs, she feels more confident that her efforts are yielding positive results. Today we covered medications, Ms. Mayo expressed understanding there are many DM medications available that target different parts of the body to improve BGLs and these medications can be used in combination when needed to get to goal.  She understands how metformin and liraglutide work in her body and denies intolerable side effects. She would like to discuss switching from liraglutide to once a week dulaglutide, I have advised she check with her insurance company to see if it is covered and discuss with Zeus Mccormick, she agreed. She is taking all medications as directed. Ms. Ivana Poole expressed understanding how/when to use the 15:15 Rule to treat hypoglycemia and states she is no longer having symptoms of hypoglycemia at BGLs above 80.       We will discuss physical activity at the next appointment, Ms. Melinda Espinoza is going to make a list of activities she is willing to try. RECOMMENDATIONS:  Ms. Melinda Espinoza would benefit from continuing to check her fasting BGL daily and 2-hour postprandial BGL 2-3 times a week after her largest meals. Next provider visit is scheduled for 9/10/2021       SMART GOAL(S)  1. Check BGL after meals 2 times over the next week to evaluate impact of carbs on BGLs  ACHIEVEMENT OF GOAL(S)  []?? 0-24%     []? ? 25-49%     []? ? 50-74%     [x]? ? %  2.    Practice building a balanced meal for lunch at least 3 times over the next week  ACHIEVEMENT OF GOAL(S)  []?? 0-24%     []? ? 25-49%     []? ? 50-74%     [x]? ? %  3.      ACHIEVEMENT OF GOAL(S)  []?? 0-24%     []? ? 25-49%     []? ? 50-74%     []? ? %       DATE DSMES TOPIC EVALUATION     8/27/2021 HOW DO MY DIABETES MEDICATIONS WORK?   a. Type 1 medications & methods    Insulin injections    Injection sites   b. Type 2 medications    Oral agents    GLP-1 agonists   c. Hypoglycemia symptoms & treatment    Glucagon emergency kits   d. General guidance regarding insulin use whether Type 1, 2 or gestational diabetes    Storage of insulin    Disposal     Traveling with medications   e. Barriers to medication adherence      The participant    Can describe the expected action & side effects of prescribed diabetes medications Yes.  Can demonstrate injection technique (if applicable) Yes. The participant needs to address buying a [employer] insulin/injectables cooling bag for travel. Mirta Lubin RN on 8/27/2021 at 2:08 PM    [facility] Emergency [employer]ations for Telehealth:  Yady Nicole was evaluated through a synchronous (real-time) audio-video encounter. The patient (or guardian if applicable) is aware that this is a billable service. Verbal consent to proceed has been obtained within the past 12 months.  The visit was conducted pursuant to the emergency declaration under the [facility], [address] [place] [address] waiver authority and the [employer] General Act. Patient identification was verified, and a caregiver was present when  appropriate. The patient was located in a state where the provider was credentialed to provide care.     Encounter date: 8/27/2021  Time in appointment: 60 minutes

## 2021-09-17 ENCOUNTER — VIRTUAL VISIT (OUTPATIENT)
Dept: DIABETES SERVICES | Age: 56
End: 2021-09-17
Payer: COMMERCIAL

## 2021-09-17 DIAGNOSIS — E11.65 UNCONTROLLED TYPE 2 DIABETES MELLITUS WITH HYPERGLYCEMIA (HCC): Primary | ICD-10-CM

## 2021-09-17 PROCEDURE — G0108 DIAB MANAGE TRN  PER INDIV: HCPCS

## 2021-09-20 NOTE — PROGRESS NOTES
[address] [place] [address] for Diabetes Health  Diabetes Self-Management Education & Support Program  Encounter note    SUMMARY  Diabetes self-care management training was completed related to Physical activity, Healthy coping and Problem solving. The participant will return on November 12 for DSMES follow-up appointment . The participant did identify SMART Goal(s): - Use microbike/stationary pedals to get in 10 minutes of aerobic activity after dinner 3 nights out of 7 between now and follow-up appointment 11/12/2021.   , and will practice knowledge and skills related to reducing risks, healthy eating and monitoring, being active and medications and healthy coping and problem solving to improve diabetes self-management. EVALUATION: Covered physical activity, healthy coping and problem solving    Ms. Melinda Espinoza has seen an increase in her fasting BGs, likely due to a number of factors including checking later in the morning versus upon waking, getting breakfast somewhat later in the morning, increased workload, hours, and work-related stress. She is aware of the impact of these stressors and states she understands why her numbers are increasing and what she needs to do to manage them better given that her work is cyclical and these stressors will be recurrent. She expressed understanding of the DM-specific benefits of physical activity and is exploring activities she can do at home around her work schedule. She is not interested in joining the [facility] at this time, but is willing to reconsider it in the next 6 months depending on the state of the pandemic. Ms. Melinda Espinoza is using a stationary microbike pedaling machine that can be pedaled with the feet or the hands/arms. She is working up to continually pedaling with her feet for 10 minutes, this gets in aerobic and resistance work. Her SMART Goal between now and DSMES follow-up in November is to pedal for at least 10 minutes after dinner at least 3x week each week. Making time for physical activity is her biggest barrier, but she feels that she can accomplish this goal at this time. She will be walking an increased amount and swimming while on vacation in this fall. She is aware she should add other resistance, balance, and flexibility activities to her regimen over the next several weeks. Ms. Jose Reynoso expressed feelings of guilt and frustration with herself about her diabetes diagnosis, however she states she is a very practical person and after the emotional response to the diagnosis she moved to wanting to know what she needed to do to best manage the disease. She denies anxiety or depression related to DM and she has an overall optimistic outlook on self-management. Ms. Jose Reynoso is aware of the outcomes of chronic stress and actively uses stress reduction techniques and problem solving skills to reduce stressors and establish solutions to barriers. She is aware of how to treat hypo/hyperglycemia and prepare for DM self-management during disasters. Ms. Jose Reynoso will use diabetes. org and diabetesfoodhub.org after DSMES as resources. RECOMMENDATIONS:  None at this time, Ms. Jose Reynoso is already scheduled for labs/A1c in the beginning of October. Next provider visit is scheduled for DSMES followup -        SMART GOAL(S)  1. Check BGL after meals 2 times over the next week to evaluate impact of carbs on BGLs  ACHIEVEMENT OF GOAL(S)  []? ?? 0-24%     []??? 25-49%     []? ?? 50-74%     [x]? ?? %  2.    Practice building a balanced meal for lunch at least 3 times over the next week  ACHIEVEMENT OF GOAL(S)  []? ?? 0-24%     []??? 25-49%     []? ?? 50-74%     [x]? ?? %  3. Use microbike/stationary pedals to get in 10 minutes of aerobic activity after dinner 3 nights out of 7 between now and follow-up appointment 11/12/2021.     ACHIEVEMENT OF GOAL(S)  []? ?? 0-24%     []??? 25-49%     []? ?? 50-74%     [x]? ?? %       DATE DSMES TOPIC EVALUATION     9/20/2021 HOW DOES PHYSICAL ACTIVITY AFFECT MY DIABETES?   a. Benefits of physical activity   b. Beginning a program of physical activity    Walking    Pedometers    Goal setting   c. Structured physical activity program    Aerobic activity    Resistance    Flexibility    Balance   d. Physical activity program progression   e. Safety issues   f. Barriers to physical activity   g. Facilitators of physical activity        The participant has established a regular physical activity plan Yes, in the early stages of exploring which activities are enjoyable and feasible around work schedule and pandemic concerns. The participant would benefit from adding resistance, flexibility, and balance activities to current aerobic activity to round out regimen. DATE DSMES TOPIC EVALUATION     9/20/2021 HOW DO I FIND SUPPORT TO TACKLE THIS CONDITION?   a. Normal responses to diabetes diagnosis or complication    Shock    Anger & resentment    Guilt/self-blame    Sadness & worry    Depression     Anxiety    Pregnancy   b. Constructive strategies to normal responses     Exploring feelings & attitudes    Motivation: Cost versus benefits analysis    Problem-solving: Chain analysis    Obtaining support: Communicating   i. Family & friends   ii. Health team   iii. Community   c. Stress    Symptoms    Managing stress    Fill your tool kit        The participant can identify people that support them in caring for their diabetes health:  Sister, healthcare team, friend    The participant would like to pursue the following in keeping themselves healthy after completing the program:  Considering joining the [facility] if job and pandemic permit, currently adding physical activity into daily routine at home, mindfulness, diabetes. Kylee Peguero    The participant would benefit from continuing to explore physical activities that are enjoyable and convenient given work schedule.      DATE DSMES TOPIC EVALUATION     9/20/2021 HOW DO I FIGURE OUT WHAT'S INFLUENCING MY BLOOD GLUCOSES?   a. Problem solving using SOAR    Set goals    Sort options    Arrive at a plan    Reaffirm plan   b. Common problems in diabetes self-care    Hypoglycemia    Hyperglycemia    Sick days   c. Pattern management   d. Disaster preparedness plan        The participant has an action plan for    Hypoglycemia Yes   Hyperglycemia Yes   Sick days Yes   During disasters Yes    The participant would benefit from implementing BG/activity/food logging to gain more insight into mealtime BG and BG response to activity as she increases the frequency and duration of exercise. Camilo Larios RN on 9/20/2021 at 10:14 AM    [facility] [employer] for Telehealth:  Magaly Newell was evaluated through a synchronous (real-time) audio-video encounter. The patient (or guardian if applicable) is aware that this is a billable service. Verbal consent to proceed has been obtained within the past 12 months. The visit was conducted pursuant to the emergency declaration under the [address] [place] [address] authority and the [employer] General Act. Patient identification was verified, and a caregiver was present when  appropriate. The patient was located in a state where the provider was credentialed to provide care.     Encounter date: 9/17/2021  Time in appointment: 70 minutes

## 2021-10-04 ENCOUNTER — APPOINTMENT (OUTPATIENT)
Dept: INTERNAL MEDICINE CLINIC | Age: 56
End: 2021-10-04

## 2021-10-04 DIAGNOSIS — E53.8 VITAMIN B12 DEFICIENCY: ICD-10-CM

## 2021-10-04 DIAGNOSIS — E11.319 CONTROLLED TYPE 2 DIABETES MELLITUS WITH RETINOPATHY OF LEFT EYE, WITHOUT LONG-TERM CURRENT USE OF INSULIN, MACULAR EDEMA PRESENCE UNSPECIFIED, UNSPECIFIED RETINOPATHY SEVERITY (HCC): ICD-10-CM

## 2021-10-04 DIAGNOSIS — E55.9 VITAMIN D DEFICIENCY: ICD-10-CM

## 2021-10-04 DIAGNOSIS — I10 ESSENTIAL HYPERTENSION: ICD-10-CM

## 2021-10-04 DIAGNOSIS — E78.2 HYPERCHOLESTEROLEMIA WITH HYPERTRIGLYCERIDEMIA: ICD-10-CM

## 2021-10-05 LAB
25(OH)D3+25(OH)D2 SERPL-MCNC: 40.5 NG/ML (ref 30–100)
ALBUMIN SERPL-MCNC: 4 G/DL (ref 3.8–4.9)
ALBUMIN/GLOB SERPL: 1.5 {RATIO} (ref 1.2–2.2)
ALP SERPL-CCNC: 115 IU/L (ref 44–121)
ALT SERPL-CCNC: 17 IU/L (ref 0–32)
AST SERPL-CCNC: 11 IU/L (ref 0–40)
BILIRUB SERPL-MCNC: 0.5 MG/DL (ref 0–1.2)
BUN SERPL-MCNC: 16 MG/DL (ref 6–24)
BUN/CREAT SERPL: 24 (ref 9–23)
CALCIUM SERPL-MCNC: 9.3 MG/DL (ref 8.7–10.2)
CHLORIDE SERPL-SCNC: 104 MMOL/L (ref 96–106)
CHOLEST SERPL-MCNC: 186 MG/DL (ref 100–199)
CO2 SERPL-SCNC: 20 MMOL/L (ref 20–29)
CREAT SERPL-MCNC: 0.67 MG/DL (ref 0.57–1)
EST. AVERAGE GLUCOSE BLD GHB EST-MCNC: 169 MG/DL
FOLATE SERPL-MCNC: 17.9 NG/ML
GLOBULIN SER CALC-MCNC: 2.6 G/DL (ref 1.5–4.5)
GLUCOSE SERPL-MCNC: 152 MG/DL (ref 65–99)
HBA1C MFR BLD: 7.5 % (ref 4.8–5.6)
HDLC SERPL-MCNC: 39 MG/DL
LDLC SERPL CALC-MCNC: 86 MG/DL (ref 0–99)
POTASSIUM SERPL-SCNC: 4.3 MMOL/L (ref 3.5–5.2)
PROT SERPL-MCNC: 6.6 G/DL (ref 6–8.5)
SODIUM SERPL-SCNC: 139 MMOL/L (ref 134–144)
TRIGL SERPL-MCNC: 376 MG/DL (ref 0–149)
VIT B12 SERPL-MCNC: 314 PG/ML (ref 232–1245)
VLDLC SERPL CALC-MCNC: 61 MG/DL (ref 5–40)

## 2021-10-14 ENCOUNTER — OFFICE VISIT (OUTPATIENT)
Dept: INTERNAL MEDICINE CLINIC | Age: 56
End: 2021-10-14
Payer: COMMERCIAL

## 2021-10-14 VITALS
TEMPERATURE: 97.1 F | RESPIRATION RATE: 16 BRPM | BODY MASS INDEX: 39.74 KG/M2 | HEIGHT: 67 IN | SYSTOLIC BLOOD PRESSURE: 126 MMHG | HEART RATE: 95 BPM | OXYGEN SATURATION: 97 % | DIASTOLIC BLOOD PRESSURE: 72 MMHG | WEIGHT: 253.2 LBS

## 2021-10-14 DIAGNOSIS — E55.9 VITAMIN D DEFICIENCY: ICD-10-CM

## 2021-10-14 DIAGNOSIS — E53.8 VITAMIN B12 DEFICIENCY: ICD-10-CM

## 2021-10-14 DIAGNOSIS — E78.2 HYPERCHOLESTEROLEMIA WITH HYPERTRIGLYCERIDEMIA: ICD-10-CM

## 2021-10-14 DIAGNOSIS — E11.319 CONTROLLED TYPE 2 DIABETES MELLITUS WITH RETINOPATHY OF LEFT EYE, WITHOUT LONG-TERM CURRENT USE OF INSULIN, MACULAR EDEMA PRESENCE UNSPECIFIED, UNSPECIFIED RETINOPATHY SEVERITY (HCC): Primary | ICD-10-CM

## 2021-10-14 DIAGNOSIS — R80.9 MICROALBUMINURIA: ICD-10-CM

## 2021-10-14 DIAGNOSIS — Z23 NEEDS FLU SHOT: ICD-10-CM

## 2021-10-14 PROCEDURE — 99214 OFFICE O/P EST MOD 30 MIN: CPT | Performed by: PHYSICIAN ASSISTANT

## 2021-10-14 PROCEDURE — 3051F HG A1C>EQUAL 7.0%<8.0%: CPT | Performed by: PHYSICIAN ASSISTANT

## 2021-10-14 RX ORDER — CYANOCOBALAMIN 1000 UG/ML
1000 INJECTION, SOLUTION INTRAMUSCULAR; SUBCUTANEOUS
Qty: 4 ML | Refills: 1
Start: 2021-10-14 | End: 2022-01-24 | Stop reason: SDUPTHER

## 2021-10-14 NOTE — PROGRESS NOTES
HPI:  60 y.o.  presents for follow up appointment. No hospital, ER or specialist visits since last primary care visit except as noted below. Last visit 8/12/21    Had labs 10/4/21 to review    DM  -current therapy: metformin 1000 mg BID (increased from 500 mg BID at visit 5/4/2020), and  Victoza 1.2 mg daily (resumed 7/8/2021 after being off of it for about 4 mos)  A1C 7.5% on 10/4/21  Previously A1C 7.9% on 8/12/21  Her average BS readings at home are trending down  -On statin and ACE-I.   -LDL now 86, was 107 on 7/8/2021, previously 60 on 3/2020  After last visit I referred her to DEP and she has had several classes with great benefit  -she is counting her carbs and setting her plate, but felt she lost weight faster with Weight Watchers  -seeing eye doctor Dr Lurdes Cueva at [facility] for retinopathy, is candidate for eye injections but needs them monthly which she can't schedule yet due to her work     Triglycerides came down to 376, from 572  LDL came down from 107 to 86  HDL 39  Total 186  Tried fish oil pills but made her vomit with first pill    Vit D level now 40.5, previously 28.6  She is taking OTC D3 now  Completed ergo 50K in 2020    B12 deficiency  She did weekly injections x 4 wks in mid-July - mid August, now back on monthly injection Sept 10 and October 10.   B12 level did not go up that much    Patient Active Problem List    Diagnosis    Microalbuminuria    Pedal edema    Drug-induced constipation    Controlled type 2 diabetes mellitus without complication, without long-term current use of insulin (HCC)    Hypercholesterolemia with hypertriglyceridemia    Vitamin D deficiency    Vitamin B12 deficiency    Elevated hemoglobin (HCC)    Essential hypertension    Obesity, morbid (HCC)    Numbness and tingling    Carpal tunnel syndrome of right wrist         Past Medical History:   Diagnosis Date    Carpal tunnel syndrome of right wrist     Cellulitis and abscess of left lower extremity     Diabetes ([facility] [address]     Hypercholesterolemia     Hypertension        Social History     Tobacco Use    Smoking status: Never Smoker    Smokeless tobacco: Never Used   Vaping Use    Vaping Use: Never used   Substance Use Topics    Alcohol use: Not Currently    Drug use: Never       Outpatient Medications Marked as Taking for the 10/14/21 encounter (Office Visit) with Sil Blackburn PA-C   Medication Sig Dispense Refill    cholecalciferol, vitamin D3, (VITAMIN D3 PO) Take  by mouth.  cyanocobalamin (VITAMIN B12) 1,000 mcg/mL injection 1 mL by IntraMUSCular route every seven (7) days. For 4 weeks. Then return to once monthly. (THIS IS A NEW DOSE) 4 Vial 0    Syringe with Needle, Safety 3 mL 25 gauge x 1\" syrg Use to inject B12 by IM monthly 5 Pen Needle 2    atorvastatin (LIPITOR) 20 mg tablet Take 1 Tablet by mouth daily. 90 Tablet 1    DULoxetine (CYMBALTA) 30 mg capsule Take 1 Capsule by mouth daily. 90 Capsule 1    liraglutide (VICTOZA) 0.6 mg/0.1 mL (18 mg/3 mL) pnij 1.2 mg by SubCUTAneous route daily. (Since you had run out, do 0.6 mg SQ daily for the first week, then go up to the 1.2 mg dose and stay) 6 Adjustable Dose Pre-filled Pen Syringe 1    lisinopriL (PRINIVIL, ZESTRIL) 10 mg tablet TAKE ONE TABLET BY MOUTH ONE TIME DAILY, DUE FOR OFFICE VISIT FOR BP CHECK 90 Tablet 1    metFORMIN (GLUCOPHAGE) 1,000 mg tablet TAKE ONE TABLET BY MOUTH TWICE A DAY WITH A MEAL. Office visit/labs due before next refill 180 Tablet 1    pen needle, diabetic (NovoTwist) 32 gauge x 1/5\" ndle 30 UNSPECIFIED by Does Not Apply route daily. Use with Victoza daily 100 Pen Needle 1    glucose blood VI test strips (ASCENSIA AUTODISC VI, ONE TOUCH ULTRA TEST VI) strip For use with glucometer to check blood sugar once a day in morning before eating. Please dispense brand covered by insurance. 100 Strip 2    lancets misc For use with glucometer to check blood sugar once a day in morning before eating.  Please dispense brand covered by insurance. 1 Each 11    Blood-Glucose Meter monitoring kit For use to check blood sugar once a day in morning before eating. Please dispense brand covered by insurance. 1 Kit 0    multivitamin (ONE A DAY) tablet Take 1 Tablet by mouth daily. Allergies   Allergen Reactions    Penicillins Atopic Dermatitis    Tetanus And Diphtheria Toxoids Unknown (comments)     In childhood       ROS:  ROS negative except as per HPI. PE:  Visit Vitals  /72 (BP 1 Location: Right arm, BP Patient Position: Sitting, BP Cuff Size: Adult)   Pulse 95   Temp 97.1 °F (36.2 °C) (Temporal)   Resp 16   Ht 5' 7\" (1.702 m)   Wt 253 lb 3.2 oz (114.9 kg)   SpO2 97%   BMI 39.66 kg/m²     Weight down 7 lbs from August, and 17 lbs from July    Gen: alert, oriented, no acute distress  Head: normocephalic, atraumatic  Eyes: pupils equal round reactive to light, sclera clear, conjunctiva clear  Oral: masked  Neck: supple  Resp: no increase work of breathing, lungs clear to ausculation bilaterally, no wheezing, rales or rhonchi  CV: S1, S2 normal.  No murmurs, rubs, or gallops. Abd: soft, not tender, not distended. .    Neuro: grossly intact . Skin: no lesion or rash  Extremities: no cyanosis or edema    No results found for this visit on 10/14/21.     Lab Results   Component Value Date/Time    Hemoglobin A1c 7.5 (H) 10/04/2021 12:00 AM    Hemoglobin A1c 5.7 (H) 07/29/2020 07:57 AM    Hemoglobin A1c 10.7 (H) 01/23/2020 08:13 AM     Lab Results   Component Value Date/Time    Cholesterol, total 186 10/04/2021 12:00 AM    HDL Cholesterol 39 (L) 10/04/2021 12:00 AM    LDL, calculated 86 10/04/2021 12:00 AM    LDL, calculated 60 03/18/2020 07:42 AM    VLDL, calculated 61 (H) 10/04/2021 12:00 AM    VLDL, calculated 60 (H) 03/18/2020 07:42 AM    Triglyceride 376 (H) 10/04/2021 12:00 AM     Lab Results   Component Value Date/Time    WBC 8.2 07/08/2021 12:00 AM    HGB 13.5 07/08/2021 12:00 AM    HCT 40.6 07/08/2021 12:00 AM PLATELET 065 39/33/6573 12:00 AM    MCV 90 07/08/2021 12:00 AM     Lab Results   Component Value Date/Time    Sodium 139 10/04/2021 12:00 AM    Potassium 4.3 10/04/2021 12:00 AM    Chloride 104 10/04/2021 12:00 AM    CO2 20 10/04/2021 12:00 AM    Glucose 152 (H) 10/04/2021 12:00 AM    BUN 16 10/04/2021 12:00 AM    Creatinine 0.67 10/04/2021 12:00 AM    BUN/Creatinine ratio 24 (H) 10/04/2021 12:00 AM    GFR est  10/04/2021 12:00 AM    GFR est non-AA 98 10/04/2021 12:00 AM    Calcium 9.3 10/04/2021 12:00 AM    Bilirubin, total 0.5 10/04/2021 12:00 AM    Alk. phosphatase 115 10/04/2021 12:00 AM    Protein, total 6.6 10/04/2021 12:00 AM    Albumin 4.0 10/04/2021 12:00 AM    A-G Ratio 1.5 10/04/2021 12:00 AM    ALT (SGPT) 17 10/04/2021 12:00 AM    AST (SGOT) 11 10/04/2021 12:00 AM     Lab Results   Component Value Date/Time    Vitamin B12 314 10/04/2021 12:00 AM    Folate 17.9 10/04/2021 12:00 AM     Lab Results   Component Value Date/Time    VITAMIN D, 25-HYDROXY 40.5 10/04/2021 12:00 AM       Lab Results   Component Value Date/Time    TSH 2.850 07/08/2021 12:00 AM     Lab Results   Component Value Date/Time    Microalb/Creat ratio (ug/mg creat.) 114 (H) 07/12/2021 12:00 AM         Assessment/Plan:      ICD-10-CM ICD-9-CM    1. 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 Hypercholesterolemia with hypertriglyceridemia  E78.2 272.2 LIPID PANEL   3. Vitamin B12 deficiency  E53.8 266.2 cyanocobalamin (VITAMIN B12) 1,000 mcg/mL injection      VITAMIN B12 & FOLATE   4. Needs flu shot  Z23 V04.81 INFLUENZA VIRUS VAC QUAD,SPLIT,PRESV FREE SYRINGE IM   5. Vitamin D deficiency  E55.9 268.9    6.  Microalbuminuria  R80.9 791.0          DM - A1C now 7.5%, was 7.9%   metformin 1000 mg BID (increased from 500 mg BID at visit 2020), and  Victoza 1.2 mg daily (resumed 2021 after being off of it for about 4 mos)  Attending DEP, notes her avg BS is trending down  On ACE-I and statin  LDL at goal  TG are still high but coming down, unable to tolerate fish oil, continue work with diabetic diet and add salmon to diet  (+)microalbumin  Foot exam 21 with majority absent sensation    Praised her weight loss, all of her efforts, and motivation    In 3 mos, check lipids, B12, A1C, will have fasting labs prior to visit  Increase B12 again to weekly x 4 wks  D level now normal, continue current D3 suppl  Continue same meds and DEP  Increase salmon in diet and exercise    Health Maintenance reviewed - updated. Orders Placed This Encounter    Influenza Virus Vaccine QUAD, PF Syr 6 Months + (Flulaval, Fluzone, Fluarix [id number])    HEMOGLOBIN A1C WITH EAG     Standing Status:   Future     Standing Expiration Date:   10/14/2022    VITAMIN B12 & FOLATE     Standing Status:   Future     Standing Expiration Date:   10/14/2022    LIPID PANEL     Standing Status:   Future     Standing Expiration Date:   10/14/2022    cyanocobalamin (VITAMIN B12) 1,000 mcg/mL injection     Si mL by IntraMUSCular route every seven (7) days. For 4 weeks. Then return to once monthly. (THIS IS A REPEAT SINCE LEVEL WAS STILL LOW)     Dispense:  4 mL     Refill:  1       Medications Discontinued During This Encounter   Medication Reason    cyanocobalamin (VITAMIN B12) 1,000 mcg/mL injection REORDER    ergocalciferol (ERGOCALCIFEROL) 1,250 mcg (50,000 unit) capsule Therapy Completed    ibuprofen (MOTRIN) 200 mg tablet LIST CLEANUP       Current Outpatient Medications   Medication Sig Dispense Refill    cholecalciferol, vitamin D3, (VITAMIN D3 PO) Take  by mouth.  cyanocobalamin (VITAMIN B12) 1,000 mcg/mL injection 1 mL by IntraMUSCular route every seven (7) days. For 4 weeks. Then return to once monthly.  (THIS IS A REPEAT SINCE LEVEL WAS STILL LOW) 4 mL 1  Syringe with Needle, Safety 3 mL 25 gauge x 1\" syrg Use to inject B12 by IM monthly 5 Pen Needle 2    atorvastatin (LIPITOR) 20 mg tablet Take 1 Tablet by mouth daily. 90 Tablet 1    DULoxetine (CYMBALTA) 30 mg capsule Take 1 Capsule by mouth daily. 90 Capsule 1    liraglutide (VICTOZA) 0.6 mg/0.1 mL (18 mg/3 mL) pnij 1.2 mg by SubCUTAneous route daily. (Since you had run out, do 0.6 mg SQ daily for the first week, then go up to the 1.2 mg dose and stay) 6 Adjustable Dose Pre-filled Pen Syringe 1    lisinopriL (PRINIVIL, ZESTRIL) 10 mg tablet TAKE ONE TABLET BY MOUTH ONE TIME DAILY, DUE FOR OFFICE VISIT FOR BP CHECK 90 Tablet 1    metFORMIN (GLUCOPHAGE) 1,000 mg tablet TAKE ONE TABLET BY MOUTH TWICE A DAY WITH A MEAL. Office visit/labs due before next refill 180 Tablet 1    pen needle, diabetic (NovoTwist) 32 gauge x 1/5\" ndle 30 UNSPECIFIED by Does Not Apply route daily. Use with Victoza daily 100 Pen Needle 1    glucose blood VI test strips (ASCENSIA AUTODISC VI, ONE TOUCH ULTRA TEST VI) strip For use with glucometer to check blood sugar once a day in morning before eating. Please dispense brand covered by insurance. 100 Strip 2    lancets misc For use with glucometer to check blood sugar once a day in morning before eating. Please dispense brand covered by insurance. 1 Each 11    Blood-Glucose Meter monitoring kit For use to check blood sugar once a day in morning before eating. Please dispense brand covered by insurance. 1 Kit 0    multivitamin (ONE A DAY) tablet Take 1 Tablet by mouth daily. Recommended healthy diet low in carbohydrates, fats, sodium and cholesterol. Recommended regular cardiovascular exercise 3-6 times per week for 30-60 minutes daily. Verbal and written instructions (see AVS) provided. Patient expresses understanding of diagnosis and treatment plan.        Follow-up and Dispositions    · Return in about 3 months (around 1/14/2022) for DM, with 2nd appt for fasting labs 1 week prior. Future Appointments   Date Time Provider Daniel Wynn   11/12/2021  1:00 PM Ad Arreguin, RN [facility] AMB   1/12/2022  8:00 AM LAB ONLY [facility] BS AMB   1/17/2022  2:30 PM Sil Blackburn PA-C [facility] AMB       Greater than 35 min was spent with her, of which more than 50% was spent on counseling.

## 2021-10-14 NOTE — PATIENT INSTRUCTIONS
Vaccine Information Statement    Influenza (Flu) Vaccine (Inactivated or Recombinant): What You Need to Know    Many vaccine information statements are available in Croatian and other languages. See www.immunize.org/vis. Hojas de información sobre vacunas están disponibles en español y en muchos otros idiomas. Visite www.immunize.org/vis. 1. Why get vaccinated? Influenza vaccine can prevent influenza (flu). Flu is a contagious disease that spreads around the [facility] every year, usually between October and May. Anyone can get the flu, but it is more dangerous for some people. Infants and young children, people 72 years and older, pregnant people, and people with certain health conditions or a weakened immune system are at greatest risk of flu complications. Pneumonia, bronchitis, sinus infections, and ear infections are examples of flu-related complications. If you have a medical condition, such as heart disease, cancer, or diabetes, flu can make it worse. Flu can cause fever and chills, sore throat, muscle aches, fatigue, cough, headache, and runny or stuffy nose. Some people may have vomiting and diarrhea, though this is more common in children than adults. In an average year, thousands of people in the [facility] die from flu, and many more are hospitalized. Flu vaccine prevents millions of illnesses and flu-related visits to the doctor each year. 2. Influenza vaccines     CDC recommends everyone 6 months and older get vaccinated every flu season. Children 6 months through 6years of age may need 2 doses during a single flu season. Everyone else needs only 1 dose each flu season. It takes about 2 weeks for protection to develop after vaccination. There are many flu viruses, and they are always changing. Each year a new flu vaccine is made to protect against the influenza viruses believed to be likely to cause disease in the upcoming flu season.  Even when the vaccine doesnt exactly match these viruses, it may still provide some protection. Influenza vaccine does not cause flu. Influenza vaccine may be given at the same time as other vaccines. 3. Talk with your health care provider    Tell your vaccination provider if the person getting the vaccine:   Has had an allergic reaction after a previous dose of influenza vaccine, or has any severe, life-threatening allergies    Has ever had Guillain-Barré Syndrome (also called GBS)    In some cases, your health care provider may decide to postpone influenza vaccination until a future visit. Influenza vaccine can be administered at any time during pregnancy. People who are or will be pregnant during influenza season should receive inactivated influenza vaccine. People with minor illnesses, such as a cold, may be vaccinated. People who are moderately or severely ill should usually wait until they recover before getting influenza vaccine. Your health care provider can give you more information. 4. Risks of a vaccine reaction     Soreness, redness, and swelling where the shot is given, fever, muscle aches, and headache can happen after influenza vaccination.  There may be a very small increased risk of Guillain-Barré Syndrome (GBS) after inactivated influenza vaccine (the flu shot). Priti Sheriff children who get the flu shot along with pneumococcal vaccine (PCV13) and/or DTaP vaccine at the same time might be slightly more likely to have a seizure caused by fever. Tell your health care provider if a child who is getting flu vaccine has ever had a seizure. People sometimes faint after medical procedures, including vaccination. Tell your provider if you feel dizzy or have vision changes or ringing in the ears. As with any medicine, there is a very remote chance of a vaccine causing a severe allergic reaction, other serious injury, or death. 5. What if there is a serious problem?     An allergic reaction could occur after the vaccinated person leaves the clinic. If you see signs of a severe allergic reaction (hives, swelling of the face and throat, difficulty breathing, a fast heartbeat, dizziness, or weakness), call 9-1-1 and get the person to the nearest hospital.    For other signs that concern you, call your health care provider. Adverse reactions should be reported to the Vaccine Adverse Event Reporting System (VAERS). Your health care provider will usually file this report, or you can do it yourself. Visit the VAERS website at www.vaers. [facility].gov or call 4-917.227.4422. VAERS is only for reporting reactions, and VAERS staff members do not give medical advice. 6. The National Vaccine Injury Compensation Program    The [facility] Vaccine Injury Compensation Program (VICP) is a federal program that was created to compensate people who may have been injured by certain vaccines. Claims regarding alleged injury or death due to vaccination have a time limit for filing, which may be as short as two years. Visit the VICP website at www.[facility].gov/vaccinecompensation or call 2-401.742.4540 to learn about the program and about filing a claim. 7. How can I learn more?  Ask your health care provider.  Call your local or state health department.  Visit the website of the Food and Drug Administration (FDA) for vaccine package inserts and additional information at www.fda.gov/vaccines-blood-biologics/vaccines.  Contact the Centers for Disease Control and Prevention (CDC):  - Call 8-610.485.8093 (1-800-CDC-INFO) or  - Visit CDCs influenza website at www.cdc.gov/flu. Vaccine Information Statement   Inactivated Influenza Vaccine   8/6/2021  42 GINA Francois [id number]   Department of Health and Human Services  Centers for Disease Control and Prevention    Office Use Only

## 2021-10-14 NOTE — PROGRESS NOTES
Sosa Swenson is a 60 y.o. female     Chief Complaint   Patient presents with    Diabetes     9 wk follow up       Visit Vitals  /72 (BP 1 Location: Right arm, BP Patient Position: Sitting, BP Cuff Size: Adult)   Pulse 95   Temp 97.1 °F (36.2 °C) (Temporal)   Resp 16   Ht 5' 7\" (1.702 m)   Wt 253 lb 3.2 oz (114.9 kg)   SpO2 97%   BMI 39.66 kg/m²       Health Maintenance Due   Topic Date Due    DTaP/Tdap/Td series (1 - Tdap) Never done    Cervical cancer screen  Never done    Colorectal Cancer Screening Combo  Never done    Shingrix Vaccine Age 50> (1 of 2) Never done    Breast Cancer Screen Mammogram  Never done    Flu Vaccine (1) 09/01/2021       1. Have you been to the ER, urgent care clinic since your last visit? Hospitalized since your last visit? No     2. Have you seen or consulted any other health care providers outside of the [address] [place] [address] since your last visit? Include any pap smears or colon screening. No       Administered influenza vaccine in right deltoid patient tolerated injection well.     Lot#:[id number]    Exp:JUNE 30 2022    W:[id number]

## 2021-10-18 PROCEDURE — 90471 IMMUNIZATION ADMIN: CPT | Performed by: PHYSICIAN ASSISTANT

## 2021-10-18 PROCEDURE — 90686 IIV4 VACC NO PRSV 0.5 ML IM: CPT | Performed by: PHYSICIAN ASSISTANT

## 2021-12-09 ENCOUNTER — VIRTUAL VISIT (OUTPATIENT)
Dept: DIABETES SERVICES | Age: 56
End: 2021-12-09
Payer: COMMERCIAL

## 2021-12-09 DIAGNOSIS — E11.65 UNCONTROLLED TYPE 2 DIABETES MELLITUS WITH HYPERGLYCEMIA (HCC): Primary | ICD-10-CM

## 2021-12-09 PROCEDURE — G0108 DIAB MANAGE TRN  PER INDIV: HCPCS

## 2021-12-09 NOTE — PROGRESS NOTES
[facility]  Diabetes Self-Management Education & Support Program  Post-program Evaluation    EVALUATION @ COMPLETION OF THE PROGRAM    Elton Marc completed 7 hours of diabetes self-management education. The participant acquired new knowledge and demonstrated new skills during the program.     The participant worked on the following SMART GOAL(s) to improve their diabetes self-management:    1. Check BGL after meals 2 times over the next week to evaluate impact of carbs on BGLs  ACHIEVEMENT OF GOAL(S)  []???? 0-24%     []???? 25-49%     []???? 50-74%     [x]? ??? %  2.    Practice building a balanced meal for lunch at least 3 times over the next week  ACHIEVEMENT OF GOAL(S)  []???? 0-24%     []???? 25-49%     []???? 50-74%     [x]? ??? %  3. Use microbike/stationary pedals to get in 10 minutes of aerobic activity after dinner 3 nights out of 7 between now and follow-up appointment 11/12/2021.     ACHIEVEMENT OF GOAL(S)  []???? 0-24%     []???? 25-49%     []???? 50-74%     [x]? ??? %    The participant's pre-program A1c was 9.1% - 7/8/2021  Lab Results   Component Value Date/Time    Hemoglobin A1c 7.5 (H) 10/04/2021 12:00 AM    Hemoglobin A1c (POC) 7.9 (A) 08/12/2021 02:28 PM   . The post-evaluation A1c is 7.5% . The participant improved their Diabetes Skills, Confidence and Preparedness Index (scored out of 7): Total score: 6.8  Skills: 6.8  Confidence: 6.6  Preparedness: 7.0  Improvement in all areas    FINAL RECOMMENDATIONS:  Ms. Rhett Jeans has made good progress and I feel confident she will be able to achieve 7.0% A1c over the next 3 months. Her main barrier at this time is a recent onset of nausea and vomiting that she has determined to be caused by her evening dose of Metformin. Onset occurred mid November, she began skipping the evening doses of Metformin off and on the week of November 20-28 and then more consistently the first week of December.   Her fasting BGs have increased from typically ranging 110s-160 to now ranging upper 200s-350. She will reach out to THE DARIANA MORRISSEY PA-C to discuss, she is anxious to maintain her A1c. Ms. Deshawn Hernandez stated she did not need further DSMES at this time, but she is aware she has 3 remaining hours of education covered by insurance that are available to her between now and 7/21/2022. Next provider visit is scheduled for n/a at this time       JENNA Villagomez on 12/9/2021 at 10:05 AM    Metric Patient's responses (12/9/2021) Areas for improvement     Healthy Eating       The participant is using the Healthy Plate to control carbohydrate intake Yes    The participant reads food labels accurately Yes          Being Active       The participant performs physical activity where your heart beats faster and your breathing is harder than normal for 30 minutes or more? 3 days    In a typical week, the participant performs physical activity 3 days          Monitoring   The participant is monitoring their blood sugars? Yes    The participant is monitoring 1x/day    Blood glucoses are ranging:   Fasting: typically 150-170s, but higher the last 2 weeks - upper 200s because patient is skipping evening dose of Metformin as it may be causing nausea and vomiting x3 weeks, Ms. Mayo to discuss with THE DARIANA MORRISSEY NP in the next few days. The participant improved their A1c Yes    The participant understands the meaning of the A1c Yes          Taking Medications   The participant understands what their diabetes medications do Yes    The participant can afford your diabetes medications Yes    The participant does not miss doses of their diabetes medications Never   Pt to discuss problems with medication with PCP this week.        Healthy Coping     The participant feels supported by family, friends and others related to their diabetes self-care practices Yes    The participant plans on utilizing the following community resources after completion of the program: Diabetes Self-Management (magazine), Diabetes [employer] and [employer]        Reducing Risks   Vaccines:  Influenza:   Immunization History   Administered Date(s) Administered    Influenza Vaccine 10/18/2019, 10/02/2020    Influenza Vaccine (Quad) PF (>6 Mo Flulaval, Fluarix, and >3 Yrs Afluria, Fluzone [id number]) 10/18/2021       Pneumococcal: There is no immunization history for the selected administration types on file for this patient. Hepatitis: There is no immunization history for the selected administration types on file for this patient. Examinations:  Diabetic Foot and Eye Exam HM Status   Topic Date Due    Diabetic Foot Care  07/08/2022    Eye Exam  08/18/2023        Dental exam: DUE    Foot exam: Last appointment was: 7/8/2021    Heart Protection:  BP Readings from Last 2 Encounters:   10/14/21 126/72   08/12/21 126/78        Lab Results   Component Value Date/Time    LDL, calculated 86 10/04/2021 12:00 AM    LDL, calculated 60 03/18/2020 07:42 AM        Key Anti-Platelet Anticoagulant Meds     The patient is on no antiplatelet meds or anticoagulants. Kidney Protection:  Lab Results   Component Value Date/Time    Microalb/Creat ratio (ug/mg creat.) 114 (H) 07/12/2021 12:00 AM      The participant would benefit from additional attention to:    Vaccines:  [] Influenza  [] Pneumococcal  [] Hepatitis    Examinations:  [] Dilated eye exam  [x] Dental exam  [] Foot exam    Other:  [] Reviewing long-term complications     Problem Solving   Hypoglycemia Management:  The participant knows the signs and symptoms of hypoglycemia Sleepiness, irritability    The participant knows how to prevent hypoglycemia? Consistent meals/snack times, Take medications as instructed and Monitor blood glucose before exercise    The participant knows how to treat hypoglycemia?  Rule of 15    Hyperglycemia Management:  The participant knows their signs and symptoms of hyperglycemia Blurred vision, tingling in hands    The participant knows how to prevent hyperglycemia? Take medication as instructed, Focus on carbohydrate counting/meal planning, Monitor blood glucose    Sick Day Management:  The participant knows what to do differently on sick days? Stay hydrated, Check blood glucose every 2-4 hours    Pattern Management:  The participant can notice blood glucose patterns when looking at their blood glucose readings Yes           Note: Content derived from the American Association of Diabetes Educators' Diabetes Education Curriculum: A Guide to Successful Self-Management (3rd edition)      I have personally reviewed the health record, including provider notes, laboratory data and current medications before making these care and education recommendations. The time spent in this effort is included in the total time. Total minutes: 36    [id number] [facility] Emergency Adaptations for Telehealth:  Sosa Swenson was evaluated through a synchronous (real-time) audio-video encounter. The patient (or guardian if applicable) is aware that this is a billable service. Verbal consent to proceed has been obtained within the past 12 months. The visit was conducted pursuant to the emergency declaration under the [address] [place] [address] authority and the [employer] and [employer] General Act. Patient identification was verified, and a caregiver was present when  appropriate. The patient was located in a state where the provider was credentialed to provide care.

## 2021-12-09 NOTE — LETTER
12/9/2021 10:04 AM    Patient:  Lavinia Pena   YOB: 1965  Date of Visit: 12/9/2021      Dear SOTERO [address]  [address] [id number]  Via In Basket: Thank you for referring Ms. Judith Foreman to me for evaluation/treatment. Details of today's visit are in my encounter note. If you have questions, please do not hesitate to call me. I look forward to following Ms. Mayo along with you.         Sincerely,      Mckayla Boo RN

## 2021-12-13 ENCOUNTER — PATIENT MESSAGE (OUTPATIENT)
Dept: INTERNAL MEDICINE CLINIC | Age: 56
End: 2021-12-13

## 2021-12-13 RX ORDER — GLIMEPIRIDE 2 MG/1
TABLET ORAL
Qty: 60 TABLET | Refills: 1 | Status: SHIPPED | OUTPATIENT
Start: 2021-12-13 | End: 2022-01-24 | Stop reason: SDUPTHER

## 2021-12-13 NOTE — TELEPHONE ENCOUNTER
From: Radhika Howard  To: Sil Nguyen PA-C  Sent: 12/13/2021 10:28 AM EST  Subject: Metformin Question     I have been experiencing many days of nausea and vomiting. I have tested negative for Covid and am concerned it is a negative reaction to the metformin. Please see the notes from [place] dated 12/09/21 were she documented what I was experiencing. Is it possible to do a virtual visit this week to discuss? I will be heading to my parents in [facility] on Saturday. I am not sure if you want to change my medicine or have any other suggestions for me. Thank you!

## 2021-12-13 NOTE — TELEPHONE ENCOUNTER
I spoke with pt  -she started having indigestion, N/V in mid-November, associated with taking metformin evening dose of 1000 mg  -of note she went on a cruise the last Saturday in November, but sxs started before she left, had (-)Covid test, no other close contacts are having these sxs  -when she lowers the dose to just one metformin 500 mg daily, she feels fine, but then her blood sugar runs in 250s-300.  -she continues to take Victoza 1.2 mg daily  -we will stop metformin  -start glimepiride 2 mg daily with breakfast, and if after several days her blood sugar is still >250 then she will take 4 mg with breakfast and stay at that dose  -cautioned re: hypoglycemic risk and symptoms  -if N/V, indigestion persists after stopping the metformin, she needs to be seen at urgent care to evaluate for cardiac causes.  She denies any symptoms with exertion.   -at follow up in Jan 2022 will discuss switching to metformin XR

## 2022-01-18 ENCOUNTER — APPOINTMENT (OUTPATIENT)
Dept: INTERNAL MEDICINE CLINIC | Age: 57
End: 2022-01-18

## 2022-01-18 DIAGNOSIS — E53.8 VITAMIN B12 DEFICIENCY: ICD-10-CM

## 2022-01-18 DIAGNOSIS — E11.319 CONTROLLED TYPE 2 DIABETES MELLITUS WITH RETINOPATHY OF LEFT EYE, WITHOUT LONG-TERM CURRENT USE OF INSULIN, MACULAR EDEMA PRESENCE UNSPECIFIED, UNSPECIFIED RETINOPATHY SEVERITY (HCC): ICD-10-CM

## 2022-01-18 DIAGNOSIS — E78.2 HYPERCHOLESTEROLEMIA WITH HYPERTRIGLYCERIDEMIA: ICD-10-CM

## 2022-01-20 LAB
CHOLEST SERPL-MCNC: 233 MG/DL (ref 100–199)
FOLATE SERPL-MCNC: 14.2 NG/ML
HDLC SERPL-MCNC: 36 MG/DL
LDLC SERPL CALC-MCNC: 122 MG/DL (ref 0–99)
TRIGL SERPL-MCNC: 425 MG/DL (ref 0–149)
VIT B12 SERPL-MCNC: 543 PG/ML (ref 232–1245)
VLDLC SERPL CALC-MCNC: 75 MG/DL (ref 5–40)

## 2022-01-24 ENCOUNTER — OFFICE VISIT (OUTPATIENT)
Dept: INTERNAL MEDICINE CLINIC | Age: 57
End: 2022-01-24
Payer: COMMERCIAL

## 2022-01-24 VITALS
DIASTOLIC BLOOD PRESSURE: 76 MMHG | WEIGHT: 253.2 LBS | TEMPERATURE: 97.8 F | HEART RATE: 88 BPM | RESPIRATION RATE: 16 BRPM | OXYGEN SATURATION: 98 % | BODY MASS INDEX: 39.74 KG/M2 | SYSTOLIC BLOOD PRESSURE: 126 MMHG | HEIGHT: 67 IN

## 2022-01-24 DIAGNOSIS — R20.0 NUMBNESS AND TINGLING: ICD-10-CM

## 2022-01-24 DIAGNOSIS — E78.2 HYPERCHOLESTEROLEMIA WITH HYPERTRIGLYCERIDEMIA: ICD-10-CM

## 2022-01-24 DIAGNOSIS — E53.8 VITAMIN B12 DEFICIENCY: ICD-10-CM

## 2022-01-24 DIAGNOSIS — G62.9 NEUROPATHY: ICD-10-CM

## 2022-01-24 DIAGNOSIS — E11.65 TYPE 2 DIABETES MELLITUS WITH HYPERGLYCEMIA, WITHOUT LONG-TERM CURRENT USE OF INSULIN (HCC): Primary | ICD-10-CM

## 2022-01-24 DIAGNOSIS — J06.9 VIRAL UPPER RESPIRATORY TRACT INFECTION: ICD-10-CM

## 2022-01-24 DIAGNOSIS — R20.2 NUMBNESS AND TINGLING: ICD-10-CM

## 2022-01-24 DIAGNOSIS — I10 ESSENTIAL HYPERTENSION: ICD-10-CM

## 2022-01-24 LAB — HBA1C MFR BLD HPLC: 7.7 % (ref 4.5–5.7)

## 2022-01-24 PROCEDURE — 3051F HG A1C>EQUAL 7.0%<8.0%: CPT | Performed by: PHYSICIAN ASSISTANT

## 2022-01-24 PROCEDURE — 83036 HEMOGLOBIN GLYCOSYLATED A1C: CPT | Performed by: PHYSICIAN ASSISTANT

## 2022-01-24 PROCEDURE — 99214 OFFICE O/P EST MOD 30 MIN: CPT | Performed by: PHYSICIAN ASSISTANT

## 2022-01-24 RX ORDER — ATORVASTATIN CALCIUM 40 MG/1
40 TABLET, FILM COATED ORAL DAILY
Qty: 90 TABLET | Refills: 1 | Status: SHIPPED | OUTPATIENT
Start: 2022-01-24

## 2022-01-24 RX ORDER — CYANOCOBALAMIN 1000 UG/ML
1000 INJECTION, SOLUTION INTRAMUSCULAR; SUBCUTANEOUS
Qty: 3 ML | Refills: 1
Start: 2022-01-24 | End: 2022-02-16 | Stop reason: SDUPTHER

## 2022-01-24 RX ORDER — DULOXETIN HYDROCHLORIDE 30 MG/1
30 CAPSULE, DELAYED RELEASE ORAL DAILY
Qty: 90 CAPSULE | Refills: 1 | Status: SHIPPED | OUTPATIENT
Start: 2022-01-24

## 2022-01-24 RX ORDER — METFORMIN HYDROCHLORIDE 500 MG/1
500 TABLET, EXTENDED RELEASE ORAL
Qty: 90 TABLET | Refills: 1 | Status: SHIPPED | OUTPATIENT
Start: 2022-01-24

## 2022-01-24 RX ORDER — LISINOPRIL 10 MG/1
TABLET ORAL
Qty: 90 TABLET | Refills: 1 | Status: SHIPPED | OUTPATIENT
Start: 2022-01-24

## 2022-01-24 RX ORDER — GLIMEPIRIDE 2 MG/1
2 TABLET ORAL
Qty: 90 TABLET | Refills: 1 | Status: SHIPPED | OUTPATIENT
Start: 2022-01-24 | End: 2022-04-04 | Stop reason: SDUPTHER

## 2022-01-24 NOTE — PROGRESS NOTES
Cali Madrigal is a 60 y.o. female     Chief Complaint   Patient presents with    Diabetes     follow up       Visit Vitals  /76 (BP 1 Location: Left arm, BP Patient Position: Sitting, BP Cuff Size: Adult)   Pulse 88   Temp 97.8 °F (36.6 °C) (Temporal)   Resp 16   Ht 5' 7\" (1.702 m)   Wt 253 lb 3.2 oz (114.9 kg)   SpO2 98%   BMI 39.66 kg/m²       Health Maintenance Due   Topic Date Due    DTaP/Tdap/Td series (1 - Tdap) Never done    Cervical cancer screen  Never done    Colorectal Cancer Screening Combo  Never done    Shingrix Vaccine Age 50> (1 of 2) Never done    Breast Cancer Screen Mammogram  Never done       1. Have you been to the ER, urgent care clinic since your last visit? Hospitalized since your last visit? No     2. Have you seen or consulted any other health care providers outside of the [address] [place] [address] since your last visit? Include any pap smears or colon screening. No     No recent Mammogram    No recent colonoscopy.

## 2022-01-24 NOTE — PATIENT INSTRUCTIONS
Counting Carbohydrates for Diabetes: Care Instructions  Your Care Instructions     You don't have to eat special foods when you have diabetes. You just have to be careful to eat healthy foods. Carbohydrates (carbs) raise blood sugar higher and quicker than any other nutrient. Carbs are found in desserts, breads and cereals, and fruit. They're also in starchy vegetables. These include potatoes, corn, and grains such as rice and pasta. Carbs are also in milk and yogurt. The more carbs you eat at one time, the higher your blood sugar will rise. Spreading carbs all through the day helps keep your blood sugar levels within your target range. Counting carbs is one of the best ways to keep your blood sugar under control. If you use insulin, counting carbs helps you match the right amount of insulin to the number of grams of carbs in a meal. Then you can change your diet and insulin dose as needed. Testing your blood sugar several times a day can help you learn how carbs affect your blood sugar. A registered dietitian or certified diabetes educator can help you plan meals and snacks. Follow-up care is a key part of your treatment and safety. Be sure to make and go to all appointments, and call your doctor if you are having problems. It's also a good idea to know your test results and keep a list of the medicines you take. How can you care for yourself at home? Know your daily amount of carbohydrates  Your daily amount depends on several things, such as your weight, how active you are, which diabetes medicines you take, and what your goals are for your blood sugar levels. A registered dietitian or certified diabetes educator can help you plan how many carbs to include in each meal and snack. For most adults, a guideline for the daily amount of carbs is:  · 45 to 60 grams at each meal. That's about the same as 3 to 4 carbohydrate servings. · 15 to 20 grams at each snack.  That's about the same as 1 carbohydrate serving. Count carbs  Counting carbs lets you know how much rapid-acting insulin to take before you eat. If you use an insulin pump, you get a constant rate of insulin during the day. So the pump must be programmed at meals. This gives you extra insulin to cover the rise in blood sugar after meals. If you take insulin:  · Learn your own insulin-to-carb ratio. You and your diabetes health professional will figure out the ratio. You can do this by testing your blood sugar after meals. For example, you may need a certain amount of insulin for every 15 grams of carbs. · Add up the carb grams in a meal. Then you can figure out how many units of insulin to take based on your insulin-to-carb ratio. · Exercise lowers blood sugar. You can use less insulin than you would if you were not doing exercise. Keep in mind that timing matters. If you exercise within 1 hour after a meal, your body may need less insulin for that meal than it would if you exercised 3 hours after the meal. Test your blood sugar to find out how exercise affects your need for insulin. If you do or don't take insulin:  · Look at labels on packaged foods. This can tell you how many carbs are in a serving. You can also use guides from the American Diabetes Association. · Be aware of portions, or serving sizes. If a package has two servings and you eat the whole package, you need to double the number of grams of carbohydrate listed for one serving. · Protein, fat, and fiber do not raise blood sugar as much as carbs do. If you eat a lot of these nutrients in a meal, your blood sugar will rise more slowly than it would otherwise. Eat from all food groups  · Eat at least three meals a day. · Plan meals to include food from all the food groups. The food groups include grains, fruits, dairy, proteins, and vegetables. · Talk to your dietitian or diabetes educator about ways to add limited amounts of sweets into your meal plan.   · If you drink alcohol, talk to your doctor. It may not be recommended when you are taking certain diabetes medicines. Where can you learn more? Go to http://www.gray.com/  Enter G703 in the search box to learn more about \"Counting Carbohydrates for Diabetes: Care Instructions. \"  Current as of: August 31, 2020               Content Version: 13.0  © 1624-7731 [employer]. Care instructions adapted under license by [employer] (which disclaims liability or warranty for this information). If you have questions about a medical condition or this instruction, always ask your healthcare professional. [address] any warranty or liability for your use of this information.

## 2022-01-24 NOTE — PROGRESS NOTES
HPI:  60 y.o.  presents for follow up appointment. No hospital, ER or specialist visits since last primary care visit except as noted below. DM  -current therapy: glimepiride 2 mg qAM (12/2021); and  Victoza 1.2 mg daily (resumed 7/8/2021 after being off of it for about 4 mos)  -metformin 1000 mg BID discontinued 12/13/21 due to N/V and indigestion  TODAY A1C 7.7%  Previously A1C 7.5% on 10/4/21, A1C 7.9% on 8/12/21  -Sugars have been running 140-170, highest was been 207  -On statin and ACE-I.   -LDL went up with current labs from 86 to 122  -she has had several classes of DEP with great benefit in fall of 2021  -she is counting her carbs and setting her plate, but felt she lost weight faster with Weight Watchers  -seeing eye doctor Dr Dyllan Calderon at [facility] for retinopathy, is candidate for eye injections but needs them monthly which she can't schedule yet due to her work   -majority absent sensation on foot exam 7/8/21       Dyslipidemia - on atorvastatin 20 mg  Triglycerides back up to 425, from 376, and previously from 572  -LDL went up with current labs from 86 to 122  HDL 36, previously 39  Total 233, previously 186  Tried fish oil pills but made her vomit with first pill    Hypertension - diagnosed 1/2020. compliant with medication, on lisinopril 10 mg daily.  No history of heart disease or stroke.  No chest pain, no shortness of breath, no headaches, no lower extremity swelling. Peripheral Neuropathy: started on Cymbalta 30 in March 2020; she reports the burning in her feet resolved within 3 days of starting it  -no worsening of her symptoms with fluctuating blood sugars recently and her neuropathy remains well controlled on the current dose    Vit D deficiency  Vit D level  40.5 on 10/4/21, previously 28.6  She is taking OTC D3 now  Completed ergo 50K in 2020     B12 deficiency  She did weekly injections x 4 wks in mid-July - mid August,  back on monthly injection Sept 10 and October 10.   B12 level did not go up that much on recheck in October  -I had wanted her to increase to qwk x 4 wks in October but the Rx did not e-scribe so she has been out of B12 for the last 2 mos and ironically her number went up  -current B12 543, previously 314    C/O ST x 3 days with congestion and mild dry cough  Denies known covid contacts  No CP or dyspnea  Her grandkids have had \"colds\"  She is vaccinated    Patient Active Problem List    Diagnosis    Microalbuminuria    Pedal edema    Drug-induced constipation    Controlled type 2 diabetes mellitus without complication, without long-term current use of insulin ([address]    Hypercholesterolemia with hypertriglyceridemia    Vitamin D deficiency    Vitamin B12 deficiency    Elevated hemoglobin ([address]    Essential hypertension    Obesity, morbid ([address]    Numbness and tingling    Carpal tunnel syndrome of right wrist         Past Medical History:   Diagnosis Date    Carpal tunnel syndrome of right wrist     Cellulitis and abscess of left lower extremity     Diabetes ([address]     Hypercholesterolemia     Hypertension        Social History     Tobacco Use    Smoking status: Never Smoker    Smokeless tobacco: Never Used   Vaping Use    Vaping Use: Never used   Substance Use Topics    Alcohol use: Not Currently    Drug use: Never       Outpatient Medications Marked as Taking for the 1/24/22 encounter (Office Visit) with Sil Jonas PA-C   Medication Sig Dispense Refill    glimepiride (AMARYL) 2 mg tablet Take 1 tab (2 mg) po with breakfast x 4 days, if blood sugar is greater than 250 increase to 2 tabs (4 mg) with breakfast 60 Tablet 1    cholecalciferol, vitamin D3, (VITAMIN D3 PO) Take  by mouth.  Syringe with Needle, Safety 3 mL 25 gauge x 1\" syrg Use to inject B12 by IM monthly 5 Pen Needle 2    atorvastatin (LIPITOR) 20 mg tablet Take 1 Tablet by mouth daily. 90 Tablet 1    DULoxetine (CYMBALTA) 30 mg capsule Take 1 Capsule by mouth daily.  90 Capsule 1    liraglutide (VICTOZA) 0.6 mg/0.1 mL (18 mg/3 mL) pnij 1.2 mg by SubCUTAneous route daily. (Since you had run out, do 0.6 mg SQ daily for the first week, then go up to the 1.2 mg dose and stay) 6 Adjustable Dose Pre-filled Pen Syringe 1    lisinopriL (PRINIVIL, ZESTRIL) 10 mg tablet TAKE ONE TABLET BY MOUTH ONE TIME DAILY, DUE FOR OFFICE VISIT FOR BP CHECK 90 Tablet 1    pen needle, diabetic (NovoTwist) 32 gauge x 1/5\" ndle 30 UNSPECIFIED by Does Not Apply route daily. Use with Victoza daily 100 Pen Needle 1    glucose blood VI test strips (ASCENSIA AUTODISC VI, ONE TOUCH ULTRA TEST VI) strip For use with glucometer to check blood sugar once a day in morning before eating. Please dispense brand covered by insurance. 100 Strip 2    lancets misc For use with glucometer to check blood sugar once a day in morning before eating. Please dispense brand covered by insurance. 1 Each 11    Blood-Glucose Meter monitoring kit For use to check blood sugar once a day in morning before eating. Please dispense brand covered by insurance. 1 Kit 0    multivitamin (ONE A DAY) tablet Take 1 Tablet by mouth daily. Allergies   Allergen Reactions    Penicillins Atopic Dermatitis    Tetanus And Diphtheria Toxoids Unknown (comments)     In childhood       ROS:  ROS negative except as per HPI.       PE:  Visit Vitals  /76 (BP 1 Location: Left arm, BP Patient Position: Sitting, BP Cuff Size: Adult)   Pulse 88   Temp 97.8 °F (36.6 °C) (Temporal)   Resp 16   Ht 5' 7\" (1.702 m)   Wt 253 lb 3.2 oz (114.9 kg)   SpO2 98%   BMI 39.66 kg/m²     Gen: alert, oriented, no acute distress  Head: normocephalic, atraumatic  Ears: external auditory canals clear, TMs without erythema or effusion  Eyes: pupils equal round reactive to light, sclera clear, conjunctiva clear  Oral: masked  Neck:  no lymphadenopathy  Resp: no increase work of breathing, lungs clear to ausculation bilaterally, no wheezing, rales or rhonchi  CV: S1, S2 normal. No murmurs, rubs, or gallops. Abd: soft, not tender, not distended. No hepatosplenomegaly. Normal bowel sounds. Neuro: grossly intact. Skin: no lesion or rash  Extremities: no cyanosis or edema    Results for orders placed or performed in visit on 01/24/22   AMB POC HEMOGLOBIN A1C   Result Value Ref Range    Hemoglobin A1c (POC) 7.7 (A) 4.5 - 5.7 %     Lab Results   Component Value Date/Time    Cholesterol, total 233 (H) 01/18/2022 08:40 AM    HDL Cholesterol 36 (L) 01/18/2022 08:40 AM    LDL, calculated 122 (H) 01/18/2022 08:40 AM    LDL, calculated 60 03/18/2020 07:42 AM    VLDL, calculated 75 (H) 01/18/2022 08:40 AM    VLDL, calculated 60 (H) 03/18/2020 07:42 AM    Triglyceride 425 (H) 01/18/2022 08:40 AM     Lab Results   Component Value Date/Time    VITAMIN D, 25-HYDROXY 40.5 10/04/2021 12:00 AM       Lab Results   Component Value Date/Time    Vitamin B12 543 01/18/2022 08:40 AM    Folate 14.2 01/18/2022 08:40 AM     Lab Results   Component Value Date/Time    WBC 8.2 07/08/2021 12:00 AM    HGB 13.5 07/08/2021 12:00 AM    HCT 40.6 07/08/2021 12:00 AM    PLATELET 684 93/52/7766 12:00 AM    MCV 90 07/08/2021 12:00 AM     Lab Results   Component Value Date/Time    Sodium 139 10/04/2021 12:00 AM    Potassium 4.3 10/04/2021 12:00 AM    Chloride 104 10/04/2021 12:00 AM    CO2 20 10/04/2021 12:00 AM    Glucose 152 (H) 10/04/2021 12:00 AM    BUN 16 10/04/2021 12:00 AM    Creatinine 0.67 10/04/2021 12:00 AM    BUN/Creatinine ratio 24 (H) 10/04/2021 12:00 AM    GFR est  10/04/2021 12:00 AM    GFR est non-AA 98 10/04/2021 12:00 AM    Calcium 9.3 10/04/2021 12:00 AM    Bilirubin, total 0.5 10/04/2021 12:00 AM    Alk. phosphatase 115 10/04/2021 12:00 AM    Protein, total 6.6 10/04/2021 12:00 AM    Albumin 4.0 10/04/2021 12:00 AM    A-G Ratio 1.5 10/04/2021 12:00 AM    ALT (SGPT) 17 10/04/2021 12:00 AM    AST (SGOT) 11 10/04/2021 12:00 AM         Assessment/Plan:      ICD-10-CM ICD-9-CM    1.  Type 2 diabetes mellitus with hyperglycemia, without long-term current use of insulin (HCC)  E11.65 250.00 glimepiride (AMARYL) 2 mg tablet     790.29 DULoxetine (CYMBALTA) 30 mg capsule      AMB POC HEMOGLOBIN A1C      metFORMIN ER (GLUCOPHAGE XR) 500 mg tablet      HEMOGLOBIN A1C WITH EAG   2. Hypercholesterolemia with hypertriglyceridemia  E78.2 272.2 atorvastatin (LIPITOR) 40 mg tablet      LIPID PANEL      HEPATIC FUNCTION PANEL   3. Essential hypertension  I10 401.9 lisinopriL (PRINIVIL, ZESTRIL) 10 mg tablet   4. Numbness and tingling  R20.0 782.0 DULoxetine (CYMBALTA) 30 mg capsule    R20.2     5. Neuropathy  G62.9 355.9 DULoxetine (CYMBALTA) 30 mg capsule   6. Vitamin B12 deficiency  E53.8 266.2 cyanocobalamin (VITAMIN B12) 1,000 mcg/mL injection   7. Viral upper respiratory tract infection  J06.9 465.9      Diabetes  A1C 7.7% today, now off of metformin regular release due to N/V indigestion   Continue current Victoza 1.2 mg and glimepiride 2mg qAM  Add metformin  mg qPM     Increase atorvastatin to 40 mg since LDL went up to 122, recheck in 3 mos    HTN - well controlled. Continue current medication. Exercise, and low salt/ low caffeine diet were discussed. Neuropathy controlled on current duloxetine 30 mg, refill given    Continue B12 injections, monthly now    Viral URI sxs, recommend covid testing and isolation until she has results      Health Maintenance reviewed - updated. Orders Placed This Encounter    HEMOGLOBIN A1C WITH EAG     Standing Status:   Future     Standing Expiration Date:   1/24/2023    LIPID PANEL     Standing Status:   Future     Standing Expiration Date:   1/24/2023    HEPATIC FUNCTION PANEL     Standing Status:   Future     Standing Expiration Date:   1/24/2023    AMB POC HEMOGLOBIN A1C    atorvastatin (LIPITOR) 40 mg tablet     Sig: Take 1 Tablet by mouth daily.      Dispense:  90 Tablet     Refill:  1    glimepiride (AMARYL) 2 mg tablet     Sig: Take 1 Tablet by mouth every morning. With breakfast     Dispense:  90 Tablet     Refill:  1    lisinopriL (PRINIVIL, ZESTRIL) 10 mg tablet     Sig: TAKE ONE TABLET BY MOUTH ONE TIME DAILY, DUE FOR OFFICE VISIT FOR BP CHECK     Dispense:  90 Tablet     Refill:  1    DULoxetine (CYMBALTA) 30 mg capsule     Sig: Take 1 Capsule by mouth daily. Dispense:  90 Capsule     Refill:  1    cyanocobalamin (VITAMIN B12) 1,000 mcg/mL injection     Si mL by IntraMUSCular route every thirty (30) days. Dispense:  3 mL     Refill:  1    metFORMIN ER (GLUCOPHAGE XR) 500 mg tablet     Sig: Take 1 Tablet by mouth daily (with dinner). Dispense:  90 Tablet     Refill:  1       Medications Discontinued During This Encounter   Medication Reason    atorvastatin (LIPITOR) 20 mg tablet REORDER    DULoxetine (CYMBALTA) 30 mg capsule REORDER    lisinopriL (PRINIVIL, ZESTRIL) 10 mg tablet REORDER    cyanocobalamin (VITAMIN B12) 1,000 mcg/mL injection REORDER    glimepiride (AMARYL) 2 mg tablet REORDER    metFORMIN (GLUCOPHAGE) 1,000 mg tablet Side Effects       Current Outpatient Medications   Medication Sig Dispense Refill    atorvastatin (LIPITOR) 40 mg tablet Take 1 Tablet by mouth daily. 90 Tablet 1    glimepiride (AMARYL) 2 mg tablet Take 1 Tablet by mouth every morning. With breakfast 90 Tablet 1    lisinopriL (PRINIVIL, ZESTRIL) 10 mg tablet TAKE ONE TABLET BY MOUTH ONE TIME DAILY, DUE FOR OFFICE VISIT FOR BP CHECK 90 Tablet 1    DULoxetine (CYMBALTA) 30 mg capsule Take 1 Capsule by mouth daily. 90 Capsule 1    cyanocobalamin (VITAMIN B12) 1,000 mcg/mL injection 1 mL by IntraMUSCular route every thirty (30) days. 3 mL 1    metFORMIN ER (GLUCOPHAGE XR) 500 mg tablet Take 1 Tablet by mouth daily (with dinner). 90 Tablet 1    cholecalciferol, vitamin D3, (VITAMIN D3 PO) Take  by mouth.       Syringe with Needle, Safety 3 mL 25 gauge x 1\" syrg Use to inject B12 by IM monthly 5 Pen Needle 2    liraglutide (VICTOZA) 0.6 mg/0.1 mL (18 mg/3 mL) pnij 1.2 mg by SubCUTAneous route daily. (Since you had run out, do 0.6 mg SQ daily for the first week, then go up to the 1.2 mg dose and stay) 6 Adjustable Dose Pre-filled Pen Syringe 1    pen needle, diabetic (NovoTwist) 32 gauge x 1/5\" ndle 30 UNSPECIFIED by Does Not Apply route daily. Use with Victoza daily 100 Pen Needle 1    glucose blood VI test strips (ASCENSIA AUTODISC VI, ONE TOUCH ULTRA TEST VI) strip For use with glucometer to check blood sugar once a day in morning before eating. Please dispense brand covered by insurance. 100 Strip 2    lancets misc For use with glucometer to check blood sugar once a day in morning before eating. Please dispense brand covered by insurance. 1 Each 11    Blood-Glucose Meter monitoring kit For use to check blood sugar once a day in morning before eating. Please dispense brand covered by insurance. 1 Kit 0    multivitamin (ONE A DAY) tablet Take 1 Tablet by mouth daily. Recommended healthy diet low in carbohydrates, fats, sodium and cholesterol. Recommended regular cardiovascular exercise 3-6 times per week for 30-60 minutes daily. Verbal and written instructions (see AVS) provided. Patient expresses understanding of diagnosis and treatment plan. Follow-up and Dispositions    · Return in about 3 months (around 4/24/2022) for DM, fasting labs 1 week prior.        Future Appointments   Date Time Provider Daniel Wynn   4/18/2022  8:00 AM LAB ONLY [facility]   4/25/2022  2:30 PM Sil Blackburn, [facility]

## 2022-01-28 ENCOUNTER — APPOINTMENT (OUTPATIENT)
Dept: CT IMAGING | Age: 57
End: 2022-01-28
Attending: EMERGENCY MEDICINE
Payer: COMMERCIAL

## 2022-01-28 ENCOUNTER — HOSPITAL ENCOUNTER (EMERGENCY)
Age: 57
Discharge: HOME | End: 2022-01-28
Attending: EMERGENCY MEDICINE
Payer: COMMERCIAL

## 2022-01-28 ENCOUNTER — APPOINTMENT (OUTPATIENT)
Dept: GENERAL RADIOLOGY | Age: 57
End: 2022-01-28
Attending: EMERGENCY MEDICINE
Payer: COMMERCIAL

## 2022-01-28 VITALS
HEART RATE: 91 BPM | WEIGHT: 256.84 LBS | SYSTOLIC BLOOD PRESSURE: 188 MMHG | RESPIRATION RATE: 18 BRPM | HEIGHT: 67 IN | DIASTOLIC BLOOD PRESSURE: 93 MMHG | TEMPERATURE: 97.6 F | OXYGEN SATURATION: 100 % | BODY MASS INDEX: 40.31 KG/M2

## 2022-01-28 DIAGNOSIS — S22.39XA CLOSED FRACTURE OF ONE RIB, UNSPECIFIED LATERALITY, INITIAL ENCOUNTER: Primary | ICD-10-CM

## 2022-01-28 DIAGNOSIS — I10 HYPERTENSION, UNSPECIFIED TYPE: ICD-10-CM

## 2022-01-28 LAB
ALBUMIN SERPL-MCNC: 3.2 G/DL (ref 3.5–5)
ALBUMIN/GLOB SERPL: 0.7 {RATIO} (ref 1.1–2.2)
ALP SERPL-CCNC: 212 U/L (ref 45–117)
ALT SERPL-CCNC: 52 U/L (ref 12–78)
ANION GAP SERPL CALC-SCNC: 6 MMOL/L (ref 5–15)
AST SERPL-CCNC: 19 U/L (ref 15–37)
BASOPHILS # BLD: 0.1 K/UL (ref 0–0.1)
BASOPHILS NFR BLD: 1 % (ref 0–1)
BILIRUB SERPL-MCNC: 0.4 MG/DL (ref 0.2–1)
BUN SERPL-MCNC: 17 MG/DL (ref 6–20)
BUN/CREAT SERPL: 22 (ref 12–20)
CALCIUM SERPL-MCNC: 9 MG/DL (ref 8.5–10.1)
CHLORIDE SERPL-SCNC: 106 MMOL/L (ref 97–108)
CO2 SERPL-SCNC: 28 MMOL/L (ref 21–32)
CREAT SERPL-MCNC: 0.76 MG/DL (ref 0.55–1.02)
DIFFERENTIAL METHOD BLD: ABNORMAL
EOSINOPHIL # BLD: 0.4 K/UL (ref 0–0.4)
EOSINOPHIL NFR BLD: 4 % (ref 0–7)
ERYTHROCYTE [DISTWIDTH] IN BLOOD BY AUTOMATED COUNT: 12.7 % (ref 11.5–14.5)
GLOBULIN SER CALC-MCNC: 4.4 G/DL (ref 2–4)
GLUCOSE SERPL-MCNC: 109 MG/DL (ref 65–100)
HCT VFR BLD AUTO: 40.9 % (ref 35–47)
HGB BLD-MCNC: 13.9 G/DL (ref 11.5–16)
IMM GRANULOCYTES # BLD AUTO: 0 K/UL (ref 0–0.04)
IMM GRANULOCYTES NFR BLD AUTO: 0 % (ref 0–0.5)
LIPASE SERPL-CCNC: 119 U/L (ref 73–393)
LYMPHOCYTES # BLD: 3.3 K/UL (ref 0.8–3.5)
LYMPHOCYTES NFR BLD: 33 % (ref 12–49)
MCH RBC QN AUTO: 30.5 PG (ref 26–34)
MCHC RBC AUTO-ENTMCNC: 34 G/DL (ref 30–36.5)
MCV RBC AUTO: 89.7 FL (ref 80–99)
MONOCYTES # BLD: 0.6 K/UL (ref 0–1)
MONOCYTES NFR BLD: 6 % (ref 5–13)
NEUTS SEG # BLD: 5.7 K/UL (ref 1.8–8)
NEUTS SEG NFR BLD: 56 % (ref 32–75)
NRBC # BLD: 0 K/UL (ref 0–0.01)
NRBC BLD-RTO: 0 PER 100 WBC
PLATELET # BLD AUTO: 405 K/UL (ref 150–400)
PMV BLD AUTO: 10.5 FL (ref 8.9–12.9)
POTASSIUM SERPL-SCNC: 4.1 MMOL/L (ref 3.5–5.1)
PROT SERPL-MCNC: 7.6 G/DL (ref 6.4–8.2)
RBC # BLD AUTO: 4.56 M/UL (ref 3.8–5.2)
SODIUM SERPL-SCNC: 140 MMOL/L (ref 136–145)
WBC # BLD AUTO: 10.1 K/UL (ref 3.6–11)

## 2022-01-28 PROCEDURE — 74011250637 HC RX REV CODE- 250/637: Performed by: EMERGENCY MEDICINE

## 2022-01-28 PROCEDURE — 99282 EMERGENCY DEPT VISIT SF MDM: CPT

## 2022-01-28 PROCEDURE — 74011000636 HC RX REV CODE- 636: Performed by: EMERGENCY MEDICINE

## 2022-01-28 PROCEDURE — 36415 COLL VENOUS BLD VENIPUNCTURE: CPT

## 2022-01-28 PROCEDURE — 71101 X-RAY EXAM UNILAT RIBS/CHEST: CPT

## 2022-01-28 PROCEDURE — 85025 COMPLETE CBC W/AUTO DIFF WBC: CPT

## 2022-01-28 PROCEDURE — 80053 COMPREHEN METABOLIC PANEL: CPT

## 2022-01-28 PROCEDURE — 83690 ASSAY OF LIPASE: CPT

## 2022-01-28 PROCEDURE — 74177 CT ABD & PELVIS W/CONTRAST: CPT

## 2022-01-28 RX ORDER — OXYCODONE HYDROCHLORIDE 5 MG/1
10 TABLET ORAL
Status: COMPLETED | OUTPATIENT
Start: 2022-01-28 | End: 2022-01-28

## 2022-01-28 RX ORDER — LIDOCAINE 50 MG/G
PATCH TOPICAL
Qty: 12 EACH | Refills: 0 | Status: SHIPPED | OUTPATIENT
Start: 2022-01-28

## 2022-01-28 RX ORDER — HYDROCODONE BITARTRATE AND ACETAMINOPHEN 5; 325 MG/1; MG/1
1 TABLET ORAL
Qty: 12 TABLET | Refills: 0 | Status: SHIPPED | OUTPATIENT
Start: 2022-01-28 | End: 2022-01-31

## 2022-01-28 RX ADMIN — OXYCODONE HYDROCHLORIDE 10 MG: 5 TABLET ORAL at 19:41

## 2022-01-28 RX ADMIN — IOPAMIDOL 100 ML: 755 INJECTION, SOLUTION INTRAVENOUS at 20:52

## 2022-01-29 NOTE — ED PROVIDER NOTES
EMERGENCY DEPARTMENT HISTORY AND PHYSICAL EXAM      Date: 1/28/2022  Patient Name: Vince Gutierres    History of Presenting Illness     Chief Complaint   Patient presents with   Jeaneen Northern Fall     She tripped and fell about 2 to 3 nights ago. She landed on her nightstand under her right breast/rib area. History Provided By: Patient    HPI: Vince Gutierres, 60 y.o. female with PMHx as noted below presents the emergency department with chief complaint of right lower chest/right upper quadrant pain. Patient states that 2 nights ago she stood up and fell onto her nightstand. Patient states she hit her right side on the nightstand. Since then has had a moderate, constant progressively worsening pain in the right side. Pain is pleuritic and worse with palpation. Pain otherwise is nonradiating. Denies any other injuries in the fall. Pt denies any other alleviating or exacerbating factors. Additionally, pt specifically denies any recent fever, chills, headache, nausea, vomiting, , SOB, lightheadedness, dizziness, numbness, weakness, BLE swelling, heart palpitations, urinary sxs, diarrhea, constipation, melena, hematochezia, cough, or congestion. PCP: Jessica Rojas PA-C    Current Outpatient Medications   Medication Sig Dispense Refill    HYDROcodone-acetaminophen (Norco) 5-325 mg per tablet Take 1 Tablet by mouth every four (4) hours as needed for Pain for up to 3 days. Max Daily Amount: 6 Tablets. 12 Tablet 0    lidocaine (Lidoderm) 5 % Apply patch to the affected area for 12 hours a day and remove for 12 hours a day. 12 Each 0    atorvastatin (LIPITOR) 40 mg tablet Take 1 Tablet by mouth daily. 90 Tablet 1    glimepiride (AMARYL) 2 mg tablet Take 1 Tablet by mouth every morning. With breakfast 90 Tablet 1    lisinopriL (PRINIVIL, ZESTRIL) 10 mg tablet TAKE ONE TABLET BY MOUTH ONE TIME DAILY, DUE FOR OFFICE VISIT FOR BP CHECK 90 Tablet 1    DULoxetine (CYMBALTA) 30 mg capsule Take 1 Capsule by mouth daily. 90 Capsule 1    cyanocobalamin (VITAMIN B12) 1,000 mcg/mL injection 1 mL by IntraMUSCular route every thirty (30) days. 3 mL 1    metFORMIN ER (GLUCOPHAGE XR) 500 mg tablet Take 1 Tablet by mouth daily (with dinner). 90 Tablet 1    cholecalciferol, vitamin D3, (VITAMIN D3 PO) Take  by mouth.  Syringe with Needle, Safety 3 mL 25 gauge x 1\" syrg Use to inject B12 by IM monthly 5 Pen Needle 2    liraglutide (VICTOZA) 0.6 mg/0.1 mL (18 mg/3 mL) pnij 1.2 mg by SubCUTAneous route daily. (Since you had run out, do 0.6 mg SQ daily for the first week, then go up to the 1.2 mg dose and stay) 6 Adjustable Dose Pre-filled Pen Syringe 1    pen needle, diabetic (NovoTwist) 32 gauge x 1/5\" ndle 30 UNSPECIFIED by Does Not Apply route daily. Use with Victoza daily 100 Pen Needle 1    glucose blood VI test strips (ASCENSIA AUTODISC VI, ONE TOUCH ULTRA TEST VI) strip For use with glucometer to check blood sugar once a day in morning before eating. Please dispense brand covered by insurance. 100 Strip 2    lancets misc For use with glucometer to check blood sugar once a day in morning before eating. Please dispense brand covered by insurance. 1 Each 11    Blood-Glucose Meter monitoring kit For use to check blood sugar once a day in morning before eating. Please dispense brand covered by insurance. 1 Kit 0    multivitamin (ONE A DAY) tablet Take 1 Tablet by mouth daily. Past History     Past Medical History:  Past Medical History:   Diagnosis Date    Carpal tunnel syndrome of right wrist     Cellulitis and abscess of left lower extremity     Diabetes ([address]     Hypercholesterolemia     Hypertension        Past Surgical History:  No past surgical history on file.     Family History:  Family History   Problem Relation Age of Onset    Other Mother         osteopenia    OSTEOARTHRITIS Mother     Hypertension Mother     Diabetes Mother     Hypertension Father     Diabetes Father     Heart Disease Father    [place] Hypertension Sister        Social History:  Social History     Tobacco Use    Smoking status: Never Smoker    Smokeless tobacco: Never Used   Vaping Use    Vaping Use: Never used   Substance Use Topics    Alcohol use: Not Currently    Drug use: Never       Allergies: Allergies   Allergen Reactions    Penicillins Atopic Dermatitis    Tetanus And Diphtheria Toxoids Unknown (comments)     In childhood         Review of Systems   Review of Systems  Constitutional: Negative for fever, chills, and fatigue. HENT: Negative for congestion, sore throat, rhinorrhea, sneezing and neck stiffness   Eyes: Negative for discharge and redness. Respiratory: Negative for  shortness of breath, wheezing   Cardiovascular: Negative for chest pain, palpitations   Gastrointestinal: Positive abdominal pain. Negative for nausea, vomiting,  constipation, diarrhea and blood in stool. Genitourinary: Negative for dysuria, hematuria, flank pain, decreased urine volume, discharge,   Musculoskeletal: Negative for myalgias or joint pain . Positive chest wall pain   skin: Negative for rash or lesions . Neurological: Negative weakness, light-headedness, numbness and headaches. Physical Exam   Physical Exam    GENERAL: alert and oriented, no acute distress  EYES: PEERL, No injection, discharge or icterus. ENT: Mucous membranes pink and moist.  NECK: Supple  LUNGS: Airway patent. Non-labored respirations. Breath sounds clear with good air entry bilaterally. HEART: Regular rate and rhythm. No peripheral edema  ABDOMEN: Non-distended, mild right upper quadrant tenderness, ecchymosis on the abdominal wall noted, without guarding or rebound.   SKIN:  warm, dry  MSK/EXTREMITIES: Without swelling, tenderness or deformity, symmetric with normal ROM, right-sided lower chest wall tenderness, no crepitus  NEUROLOGICAL: Alert, oriented      Diagnostic Study Results     Labs -  Reviewed and interpreted by me    Radiologic Studies -   CT ABD PELV W CONT   Final Result   1. Possible fracture of the costochondral junction of the lateral 11th rib      2. Nonspecific mesenteric misting of uncertain clinical significance. 3.  Other incidental findings as above      XR RIBS RT W PA CXR MIN 3 V   Final Result   No rib fractures identified. No acute cardiopulmonary process                    CT Results  (Last 48 hours)               01/28/22 2052  CT ABD PELV W CONT Final result    Impression:  1. Possible fracture of the costochondral junction of the lateral 11th rib       2. Nonspecific mesenteric misting of uncertain clinical significance. 3.  Other incidental findings as above       Narrative:  EXAM: CT ABD PELV W CONT       INDICATION: fall, ruq pain       COMPARISON: No comparisons        CONTRAST: 100 mL of Isovue-370. TECHNIQUE:    Following the uneventful intravenous administration of contrast, thin axial   images were obtained through the abdomen and pelvis. Coronal and sagittal   reconstructions were generated. Oral contrast was not administered. CT dose   reduction was achieved through use of a standardized protocol tailored for this   examination and automatic exposure control for dose modulation. FINDINGS:    LOWER THORAX: Linear atelectasis in the bilateral lung bases. LIVER: No mass. BILIARY TREE: Gallbladder is within normal limits. CBD is not dilated. SPLEEN: within normal limits. PANCREAS: No mass or ductal dilatation. ADRENALS: Unremarkable. KIDNEYS: No mass, calculus, or hydronephrosis. Simple cyst in left kidney   requiring no further follow-up. STOMACH: Unremarkable. SMALL BOWEL: No dilatation or wall thickening. COLON: No dilatation or wall thickening. APPENDIX: Unremarkable   PERITONEUM: Mild mesenteric central edema or misting   RETROPERITONEUM: No lymphadenopathy or aortic aneurysm. REPRODUCTIVE ORGANS: Unremarkable   URINARY BLADDER: No mass or calculus.    BONES: Possible fracture at the costochondral junction of the lateral 11th rib. ABDOMINAL WALL: Fat-containing abdominal hernia   ADDITIONAL COMMENTS: N/A               CXR Results  (Last 48 hours)    None            Medical Decision Making     I, Frances Street MD am the first provider for this patient and am the attending of record for this patient encounter. I reviewed the vital signs, available nursing notes, past medical history, past surgical history, family history and social history. Vital Signs-Reviewed the patient's vital signs. Records Reviewed: Nursing Notes and Old Medical Records    Provider Notes (Medical Decision Making): On presentation, the patient is well appearing, in no acute distress noted to be hypertensive on arrival..  Based on my history and exam the differential diagnosis for this patient includes rib fracture, pneumothorax, hemothorax, contusion, liver injury, spleen injury, hematoma    Basic labs obtained, mitral rotation are reassuring and nondiagnostic. CT imaging did show possible rib fracture on the 11th costochondral junction, nonspecific misting of the mesentery however no obvious significant intra-abdominal trauma noted. Patient's pain was controlled with p.o. oxycodone. Patient counseled on home pain control as well as incentive spirometer use. Patient instructed to return to the emergency department immediately if her symptoms are to worsen. ED Course:   Initial assessment performed. The patients presenting problems have been discussed, and they are in agreement with the care plan formulated and outlined with them. I have encouraged them to ask questions as they arise throughout their visit.         Medications   oxyCODONE IR (ROXICODONE) tablet 10 mg (10 mg Oral Given 1/28/22 1941)   iopamidoL (ISOVUE-370) 76 % injection 100 mL (100 mL IntraVENous Given 1/28/22 2052)     HYPERTENSION COUNSELING:   Patient denies any symptoms suggestive of endorgan damage from her high blood pressure, no indication for lowering in the emergency department. . Patient is made aware of their elevated blood pressure and is instructed to follow up this week with their Primary Care for a recheck. Patient is counseled regarding consequences of chronic, uncontrolled hypertension including kidney disease, heart disease, stroke or even death. Patient verbally states their understanding. PROGRESS  Chris Mayo's  results have been reviewed with her. She has been counseled regarding her diagnosis. She verbally conveys understanding and agreement of the signs, symptoms, diagnosis, treatment and prognosis and additionally agrees to follow up as recommended with Dr. Navin Oneill, Sil MYERS PA-C in 24 - 48 hours. She also agrees with the care-plan and conveys that all of her questions have been answered. I have also put together some discharge instructions for her that include: 1) educational information regarding their diagnosis, 2) how to care for their diagnosis at home, as well a 3) list of reasons why they would want to return to the ED prior to their follow-up appointment, should their condition change. Disposition:  home    PLAN:  1. Discharge Medication List as of 1/28/2022  9:27 PM      START taking these medications    Details   HYDROcodone-acetaminophen (Norco) 5-325 mg per tablet Take 1 Tablet by mouth every four (4) hours as needed for Pain for up to 3 days. Max Daily Amount: 6 Tablets., Normal, Disp-12 Tablet, R-0      lidocaine (Lidoderm) 5 % Apply patch to the affected area for 12 hours a day and remove for 12 hours a day., Normal, Disp-12 Each, R-0         CONTINUE these medications which have NOT CHANGED    Details   atorvastatin (LIPITOR) 40 mg tablet Take 1 Tablet by mouth daily. , Normal, Disp-90 Tablet, R-1      glimepiride (AMARYL) 2 mg tablet Take 1 Tablet by mouth every morning.  With breakfast, Normal, Disp-90 Tablet, R-1      lisinopriL (PRINIVIL, ZESTRIL) 10 mg tablet TAKE ONE TABLET BY MOUTH ONE TIME DAILY, DUE FOR OFFICE VISIT FOR BP CHECK, Normal, Disp-90 Tablet, R-1      DULoxetine (CYMBALTA) 30 mg capsule Take 1 Capsule by mouth daily. , Normal, Disp-90 Capsule, R-1      cyanocobalamin (VITAMIN B12) 1,000 mcg/mL injection 1 mL by IntraMUSCular route every thirty (30) days. , No Print, Disp-3 mL, R-1      metFORMIN ER (GLUCOPHAGE XR) 500 mg tablet Take 1 Tablet by mouth daily (with dinner). , Normal, Disp-90 Tablet, R-1      cholecalciferol, vitamin D3, (VITAMIN D3 PO) Take  by mouth., Historical Med      Syringe with Needle, Safety 3 mL 25 gauge x 1\" syrg Use to inject B12 by IM monthly, Normal, Disp-5 Pen Needle, R-2      liraglutide (VICTOZA) 0.6 mg/0.1 mL (18 mg/3 mL) pnij 1.2 mg by SubCUTAneous route daily. (Since you had run out, do 0.6 mg SQ daily for the first week, then go up to the 1.2 mg dose and stay), Normal, Disp-6 Adjustable Dose Pre-filled Pen Syringe, R-1      pen needle, diabetic (NovoTwist) 32 gauge x 1/5\" ndle 30 UNSPECIFIED by Does Not Apply route daily. Use with Victoza daily, Normal, Disp-100 Pen Needle, R-1      glucose blood VI test strips (ASCENSIA AUTODISC VI, ONE TOUCH ULTRA TEST VI) strip For use with glucometer to check blood sugar once a day in morning before eating. Please dispense brand covered by insurance., Normal, Disp-100 Strip, R-2      lancets misc For use with glucometer to check blood sugar once a day in morning before eating. Please dispense brand covered by insurance., Normal, Disp-1 Each, R-11      Blood-Glucose Meter monitoring kit For use to check blood sugar once a day in morning before eating. Please dispense brand covered by insurance., Normal, Disp-1 Kit, R-0      multivitamin (ONE A DAY) tablet Take 1 Tablet by mouth daily. , Historical Med           2.    Follow-up Information     Follow up With Specialties Details Why Contact Piyush Kimbrough PA-C Physician Assistant Schedule an appointment as soon as possible for a visit in 2 days [address]  [address] [facility].  [phone]      \[facility] [place] [facility]\"" EMERGENCY DEPT Emergency Medicine  If symptoms worsen [address] [place] [address]  [phone]        Return to ED if worse     Diagnosis     Clinical Impression:   1. Closed fracture of one rib, unspecified laterality, initial encounter    2. Hypertension, unspecified type        Please note that this dictation was completed with Dragon, computer voice recognition software. Quite often unanticipated grammatical, syntax, homophones, and other interpretive errors are inadvertently transcribed by the computer software. Please disregard these errors. Additionally, please excuse any errors that have escaped final proofreading.

## 2022-02-16 DIAGNOSIS — E53.8 VITAMIN B12 DEFICIENCY: ICD-10-CM

## 2022-02-16 NOTE — TELEPHONE ENCOUNTER
PCP: Janny Hall PA-C    Last appt: 2022  Future Appointments   Date Time Provider Daniel Wynn   2022  8:00 AM LAB ONLY [facility] BS AMB   2022  2:30 PM Sil Blackburn PA-C [facility] AMB       Requested Prescriptions     Pending Prescriptions Disp Refills    cyanocobalamin (VITAMIN B12) 1,000 mcg/mL injection 3 mL 1     Si mL by IntraMUSCular route every thirty (30) days.        Prior labs and Blood pressures:  BP Readings from Last 3 Encounters:   22 (!) 188/93   22 126/76   10/14/21 126/72     Lab Results   Component Value Date/Time    Sodium 140 2022 07:34 PM    Potassium 4.1 2022 07:34 PM    Chloride 106 2022 07:34 PM    CO2 28 2022 07:34 PM    Anion gap 6 2022 07:34 PM    Glucose 109 (H) 2022 07:34 PM    BUN 17 2022 07:34 PM    Creatinine 0.76 2022 07:34 PM    BUN/Creatinine ratio 22 (H) 2022 07:34 PM    GFR est AA >60 2022 07:34 PM    GFR est non-AA >60 2022 07:34 PM    Calcium 9.0 2022 07:34 PM     Lab Results   Component Value Date/Time    Hemoglobin A1c 7.5 (H) 10/04/2021 12:00 AM    Hemoglobin A1c (POC) 7.7 (A) 2022 02:10 PM     Lab Results   Component Value Date/Time    Cholesterol, total 233 (H) 2022 08:40 AM    HDL Cholesterol 36 (L) 2022 08:40 AM    LDL, calculated 122 (H) 2022 08:40 AM    LDL, calculated 60 2020 07:42 AM    VLDL, calculated 75 (H) 2022 08:40 AM    VLDL, calculated 60 (H) 2020 07:42 AM    Triglyceride 425 (H) 2022 08:40 AM     Lab Results   Component Value Date/Time    VITAMIN D, 25-HYDROXY 40.5 10/04/2021 12:00 AM       Lab Results   Component Value Date/Time    TSH 2.850 2021 12:00 AM

## 2022-02-18 RX ORDER — CYANOCOBALAMIN 1000 UG/ML
1000 INJECTION, SOLUTION INTRAMUSCULAR; SUBCUTANEOUS
Qty: 3 ML | Refills: 1
Start: 2022-02-18

## 2022-03-08 DIAGNOSIS — E11.65 TYPE 2 DIABETES MELLITUS WITH HYPERGLYCEMIA, WITHOUT LONG-TERM CURRENT USE OF INSULIN (HCC): ICD-10-CM

## 2022-03-08 NOTE — TELEPHONE ENCOUNTER
PCP: Hero Johnson PA-C    Last appt: 2022  Future Appointments   Date Time Provider Daniel Tianna   2022  8:00 AM LAB ONLY [facility] AMB   2022  2:30 PM Sil Blackburn PA-C [facility] AMB       Requested Prescriptions     Pending Prescriptions Disp Refills    pen needle, diabetic (NovoTwist) 32 gauge x 1/5\" ndle 100 Pen Needle 1     Si UNSPECIFIED by Does Not Apply route daily.  Use with Victoza daily       Prior labs and Blood pressures:  BP Readings from Last 3 Encounters:   22 (!) 188/93   22 126/76   10 126/72     Lab Results   Component Value Date/Time    Sodium 140 2022 07:34 PM    Potassium 4.1 2022 07:34 PM    Chloride 106 2022 07:34 PM    CO2 28 2022 07:34 PM    Anion gap 6 2022 07:34 PM    Glucose 109 (H) 2022 07:34 PM    BUN 17 2022 07:34 PM    Creatinine 0.76 2022 07:34 PM    BUN/Creatinine ratio 22 (H) 2022 07:34 PM    GFR est AA >60 2022 07:34 PM    GFR est non-AA >60 2022 07:34 PM    Calcium 9.0 2022 07:34 PM     Lab Results   Component Value Date/Time    Hemoglobin A1c 7.5 (H) 10/04/2021 12:00 AM    Hemoglobin A1c (POC) 7.7 (A) 2022 02:10 PM     Lab Results   Component Value Date/Time    Cholesterol, total 233 (H) 2022 08:40 AM    HDL Cholesterol 36 (L) 2022 08:40 AM    LDL, calculated 122 (H) 2022 08:40 AM    LDL, calculated 60 2020 07:42 AM    VLDL, calculated 75 (H) 2022 08:40 AM    VLDL, calculated 60 (H) 2020 07:42 AM    Triglyceride 425 (H) 2022 08:40 AM     Lab Results   Component Value Date/Time    VITAMIN D, 25-HYDROXY 40.5 10/04/2021 12:00 AM       Lab Results   Component Value Date/Time    TSH 2.850 2021 12:00 AM

## 2022-03-10 RX ORDER — PEN NEEDLE, DIABETIC 30 GX 1/3"
30 NEEDLE, DISPOSABLE MISCELLANEOUS DAILY
Qty: 100 PEN NEEDLE | Refills: 1 | Status: SHIPPED | OUTPATIENT
Start: 2022-03-10

## 2022-03-18 PROBLEM — E55.9 VITAMIN D DEFICIENCY: Status: ACTIVE | Noted: 2020-02-04

## 2022-03-18 PROBLEM — D58.2 ELEVATED HEMOGLOBIN (HCC): Status: ACTIVE | Noted: 2020-02-04

## 2022-03-18 PROBLEM — E66.01 OBESITY, MORBID (HCC): Status: ACTIVE | Noted: 2020-01-21

## 2022-03-18 PROBLEM — K59.03 DRUG-INDUCED CONSTIPATION: Status: ACTIVE | Noted: 2020-03-16

## 2022-03-19 PROBLEM — E11.9 CONTROLLED TYPE 2 DIABETES MELLITUS WITHOUT COMPLICATION, WITHOUT LONG-TERM CURRENT USE OF INSULIN (HCC): Status: ACTIVE | Noted: 2020-02-04

## 2022-03-19 PROBLEM — R20.2 NUMBNESS AND TINGLING: Status: ACTIVE | Noted: 2020-01-21

## 2022-03-19 PROBLEM — R60.0 PEDAL EDEMA: Status: ACTIVE | Noted: 2020-03-16

## 2022-03-19 PROBLEM — I10 ESSENTIAL HYPERTENSION: Status: ACTIVE | Noted: 2020-02-04

## 2022-03-19 PROBLEM — E53.8 VITAMIN B12 DEFICIENCY: Status: ACTIVE | Noted: 2020-02-04

## 2022-03-19 PROBLEM — R20.0 NUMBNESS AND TINGLING: Status: ACTIVE | Noted: 2020-01-21

## 2022-03-19 PROBLEM — E78.2 HYPERCHOLESTEROLEMIA WITH HYPERTRIGLYCERIDEMIA: Status: ACTIVE | Noted: 2020-02-04

## 2022-03-20 PROBLEM — R80.9 MICROALBUMINURIA: Status: ACTIVE | Noted: 2020-03-19

## 2022-04-04 DIAGNOSIS — E11.65 TYPE 2 DIABETES MELLITUS WITH HYPERGLYCEMIA, WITHOUT LONG-TERM CURRENT USE OF INSULIN (HCC): ICD-10-CM

## 2022-04-04 RX ORDER — GLIMEPIRIDE 2 MG/1
2 TABLET ORAL 2 TIMES DAILY
Qty: 180 TABLET | Refills: 1 | Status: SHIPPED | OUTPATIENT
Start: 2022-04-04

## 2022-04-04 NOTE — TELEPHONE ENCOUNTER
PCP: Domenick Kawasaki, PA-C    Last appt: 1/24/2022  Future Appointments   Date Time Provider Daniel Wynn   4/18/2022  8:00 AM LAB ONLY [facility] AMB   4/25/2022  2:30 PM Sil Blackburn PA-C [facility] AMB     **Patient stated she is taking this medication twice daily and has discontinued the metformin er due to it causing constipation. Patient only has two pills left. **  Requested Prescriptions     Pending Prescriptions Disp Refills    glimepiride (AMARYL) 2 mg tablet 90 Tablet 1     Sig: Take 1 Tablet by mouth every morning.  With breakfast       Prior labs and Blood pressures:  BP Readings from Last 3 Encounters:   01/28/22 (!) 188/93   01/24/22 126/76   10/14/21 126/72     Lab Results   Component Value Date/Time    Sodium 140 01/28/2022 07:34 PM    Potassium 4.1 01/28/2022 07:34 PM    Chloride 106 01/28/2022 07:34 PM    CO2 28 01/28/2022 07:34 PM    Anion gap 6 01/28/2022 07:34 PM    Glucose 109 (H) 01/28/2022 07:34 PM    BUN 17 01/28/2022 07:34 PM    Creatinine 0.76 01/28/2022 07:34 PM    BUN/Creatinine ratio 22 (H) 01/28/2022 07:34 PM    GFR est AA >60 01/28/2022 07:34 PM    GFR est non-AA >60 01/28/2022 07:34 PM    Calcium 9.0 01/28/2022 07:34 PM     Lab Results   Component Value Date/Time    Hemoglobin A1c 7.5 (H) 10/04/2021 12:00 AM    Hemoglobin A1c (POC) 7.7 (A) 01/24/2022 02:10 PM     Lab Results   Component Value Date/Time    Cholesterol, total 233 (H) 01/18/2022 08:40 AM    HDL Cholesterol 36 (L) 01/18/2022 08:40 AM    LDL, calculated 122 (H) 01/18/2022 08:40 AM    LDL, calculated 60 03/18/2020 07:42 AM    VLDL, calculated 75 (H) 01/18/2022 08:40 AM    VLDL, calculated 60 (H) 03/18/2020 07:42 AM    Triglyceride 425 (H) 01/18/2022 08:40 AM     Lab Results   Component Value Date/Time    VITAMIN D, 25-HYDROXY 40.5 10/04/2021 12:00 AM       Lab Results   Component Value Date/Time    TSH 2.850 07/08/2021 12:00 AM

## 2022-08-08 LAB — HBA1C MFR BLD HPLC: 8.7 %

## 2023-01-30 ENCOUNTER — TRANSCRIBE ORDER (OUTPATIENT)
Dept: SCHEDULING | Age: 58
End: 2023-01-30

## 2023-01-30 DIAGNOSIS — Z12.31 SCREENING MAMMOGRAM FOR BREAST CANCER: Primary | ICD-10-CM

## 2023-02-27 ENCOUNTER — HOSPITAL ENCOUNTER (OUTPATIENT)
Dept: MAMMOGRAPHY | Age: 58
Discharge: HOME | End: 2023-02-27
Attending: NURSE PRACTITIONER
Payer: COMMERCIAL

## 2023-02-27 DIAGNOSIS — Z12.31 SCREENING MAMMOGRAM FOR BREAST CANCER: ICD-10-CM

## 2023-02-27 PROCEDURE — 77067 SCR MAMMO BI INCL CAD: CPT

## 2023-05-21 RX ORDER — CYANOCOBALAMIN 1000 UG/ML
1000 INJECTION, SOLUTION INTRAMUSCULAR; SUBCUTANEOUS
COMMUNITY
Start: 2022-02-18

## 2023-05-21 RX ORDER — ATORVASTATIN CALCIUM 40 MG/1
40 TABLET, FILM COATED ORAL DAILY
COMMUNITY
Start: 2022-01-24

## 2023-05-21 RX ORDER — LIRAGLUTIDE 6 MG/ML
INJECTION SUBCUTANEOUS
COMMUNITY
Start: 2022-03-08

## 2023-05-21 RX ORDER — METFORMIN HYDROCHLORIDE 500 MG/1
500 TABLET, EXTENDED RELEASE ORAL
COMMUNITY
Start: 2022-01-24

## 2023-05-21 RX ORDER — LANCETS 30 GAUGE
EACH MISCELLANEOUS
COMMUNITY
Start: 2021-02-17

## 2023-05-21 RX ORDER — GLIMEPIRIDE 2 MG/1
2 TABLET ORAL 2 TIMES DAILY
COMMUNITY
Start: 2022-04-04

## 2023-05-21 RX ORDER — DULOXETIN HYDROCHLORIDE 30 MG/1
30 CAPSULE, DELAYED RELEASE ORAL DAILY
COMMUNITY
Start: 2022-01-24

## 2023-05-21 RX ORDER — LIDOCAINE 50 MG/G
PATCH TOPICAL
COMMUNITY
Start: 2022-01-28

## 2023-05-21 RX ORDER — LISINOPRIL 10 MG/1
TABLET ORAL
COMMUNITY
Start: 2022-01-24